# Patient Record
Sex: MALE | Race: WHITE | NOT HISPANIC OR LATINO | URBAN - METROPOLITAN AREA
[De-identification: names, ages, dates, MRNs, and addresses within clinical notes are randomized per-mention and may not be internally consistent; named-entity substitution may affect disease eponyms.]

---

## 2018-01-22 ENCOUNTER — EMERGENCY (EMERGENCY)
Facility: HOSPITAL | Age: 63
LOS: 1 days | Discharge: HOME | End: 2018-01-22
Attending: EMERGENCY MEDICINE | Admitting: EMERGENCY MEDICINE
Payer: COMMERCIAL

## 2018-01-22 VITALS
SYSTOLIC BLOOD PRESSURE: 170 MMHG | RESPIRATION RATE: 16 BRPM | HEART RATE: 84 BPM | TEMPERATURE: 99 F | DIASTOLIC BLOOD PRESSURE: 92 MMHG | OXYGEN SATURATION: 96 %

## 2018-01-22 PROCEDURE — 99284 EMERGENCY DEPT VISIT MOD MDM: CPT | Mod: 25

## 2018-01-22 RX ORDER — ACETAMINOPHEN 500 MG
1000 TABLET ORAL ONCE
Qty: 0 | Refills: 0 | Status: COMPLETED | OUTPATIENT
Start: 2018-01-22 | End: 2018-01-22

## 2018-01-22 RX ORDER — KETOROLAC TROMETHAMINE 30 MG/ML
15 SYRINGE (ML) INJECTION ONCE
Qty: 0 | Refills: 0 | Status: DISCONTINUED | OUTPATIENT
Start: 2018-01-22 | End: 2018-01-23

## 2018-01-22 RX ORDER — SODIUM CHLORIDE 9 MG/ML
2000 INJECTION INTRAMUSCULAR; INTRAVENOUS; SUBCUTANEOUS ONCE
Qty: 0 | Refills: 0 | Status: COMPLETED | OUTPATIENT
Start: 2018-01-22 | End: 2018-01-22

## 2018-01-22 NOTE — ED PROVIDER NOTE - OBJECTIVE STATEMENT
62 year old male, past medical history HTN, who presents to the ED for cough. 1 month of cough, bringing up green sputum. Had some mild shortness of breath and chest pain with the cough, improving after antibiotics, he is unsure which one. He thinks azithromycin, prescribed by Hima, who is PCP. No fevers but reports chills. No nausea, vomiting. Reports loose stools. Has also been taking tamiflu for 2 days. No sick contacts. No recent travel outside.

## 2018-01-22 NOTE — ED PROVIDER NOTE - MEDICAL DECISION MAKING DETAILS
Report called to Anat MOLINA. Plan of care discussed. Patient reports 6/10 abdominal discomfort. Dozing intermittently without s/s of distress. Patient on bedpan attempting to urinate. Friend diana called multiple times and provided with update per patient's request. Patient spoke with diana on phone. Patient repeatedly removing oxygen tubing, left on room air with oxygen saturation at 95%   62 year old male, past medical history HTN, who presents to the ED for cough. 1 month of cough, bringing up green sputum. Eval for resistant pneumonia given on abx now, viral illness. Obtain labwork. cxr, rvp, symptomatic control.

## 2018-01-22 NOTE — ED PROVIDER NOTE - ATTENDING CONTRIBUTION TO CARE
I was physically present for the key portions of the evaluation and management (E/M) service provided.  I agree with the above history, physical, and plan which I have reviewed and edited where appropriate. See HPI, PE, PMH, ROS and MDM above.   GEN: NAD, awake, eyes open spontaneously  HEENT: NCAT, MMM, Trachea midline, normal conjunctiva, perrl  CHEST/LUNGS: Non-tachypneic, CTAB, bilateral breath sounds, cough with some transmitted upper airway rattling noted  CARDIAC: Non-tachycardic, normal perfusion  ABDOMEN: Soft, NTND, No rebound/guarding  MSK: No edema, no gross deformity of extremities  SKIN: No rashes, no petechiae, no vesicles  NEURO: CN grossly intact, normal coordination, no focal motor or sensory deficits  PSYCH: Alert, appropriate, cooperative, with capacity and insight   Patient with likely viral uri taken antibiotics and on tamiflu   will screen for CAPna with iv, labs, cxr, and rvp for possible flu  Will follow up on labs, analgesia, reassess and disposition as clinically indicated.   Patient  understands anticipatory guidance and was given strict return and follow up precautions. The patient has been informed of all concerning signs and symptoms to return to Emergency Department, the necessity to follow up with PMD/Clinic/follow up provided within 2-3 days was explained, and the patient reports understanding of above with capacity and insight if patient disposition is to be home.

## 2018-01-22 NOTE — ED ADULT NURSE NOTE - OBJECTIVE STATEMENT
Patient presented to ED c/o flu/cold like symptoms for 3 weeks. Cough, chills, malaise, body aches, most of the symptoms are resolving but still coughing.

## 2018-01-22 NOTE — ED PROVIDER NOTE - SHIFT CHANGE DETAILS
Received sign-out from Dr. Bruno regarding this patient awaiting labs and CXR. Patient appeared at the time of sign-out entirely well and very comfortable. His labs are unremarkable and his CXR is negative. His rapid flu has not yet resulted but he did not want to wait any longer. His symptoms have been for an extended duration anyway. Still entirely well-appearing and comfortable, he has been discharged as requested.

## 2018-01-23 VITALS
OXYGEN SATURATION: 97 % | SYSTOLIC BLOOD PRESSURE: 129 MMHG | RESPIRATION RATE: 17 BRPM | HEART RATE: 75 BPM | DIASTOLIC BLOOD PRESSURE: 77 MMHG

## 2018-01-23 LAB
ALBUMIN SERPL ELPH-MCNC: 3.7 G/DL — SIGNIFICANT CHANGE UP (ref 3.3–5)
ALP SERPL-CCNC: 80 U/L — SIGNIFICANT CHANGE UP (ref 40–120)
ALT FLD-CCNC: 20 U/L RC — SIGNIFICANT CHANGE UP (ref 10–45)
ANION GAP SERPL CALC-SCNC: 11 MMOL/L — SIGNIFICANT CHANGE UP (ref 5–17)
AST SERPL-CCNC: 26 U/L — SIGNIFICANT CHANGE UP (ref 10–40)
BASOPHILS # BLD AUTO: 0 K/UL — SIGNIFICANT CHANGE UP (ref 0–0.2)
BASOPHILS NFR BLD AUTO: 0.1 % — SIGNIFICANT CHANGE UP (ref 0–2)
BILIRUB SERPL-MCNC: 0.2 MG/DL — SIGNIFICANT CHANGE UP (ref 0.2–1.2)
BUN SERPL-MCNC: 18 MG/DL — SIGNIFICANT CHANGE UP (ref 7–23)
CALCIUM SERPL-MCNC: 9.1 MG/DL — SIGNIFICANT CHANGE UP (ref 8.4–10.5)
CHLORIDE SERPL-SCNC: 101 MMOL/L — SIGNIFICANT CHANGE UP (ref 96–108)
CO2 SERPL-SCNC: 25 MMOL/L — SIGNIFICANT CHANGE UP (ref 22–31)
CREAT SERPL-MCNC: 1 MG/DL — SIGNIFICANT CHANGE UP (ref 0.5–1.3)
EOSINOPHIL # BLD AUTO: 0.5 K/UL — SIGNIFICANT CHANGE UP (ref 0–0.5)
EOSINOPHIL NFR BLD AUTO: 6.2 % — HIGH (ref 0–6)
GLUCOSE SERPL-MCNC: 109 MG/DL — HIGH (ref 70–99)
HCT VFR BLD CALC: 39.3 % — SIGNIFICANT CHANGE UP (ref 39–50)
HGB BLD-MCNC: 13.7 G/DL — SIGNIFICANT CHANGE UP (ref 13–17)
LYMPHOCYTES # BLD AUTO: 2 K/UL — SIGNIFICANT CHANGE UP (ref 1–3.3)
LYMPHOCYTES # BLD AUTO: 23.5 % — SIGNIFICANT CHANGE UP (ref 13–44)
MCHC RBC-ENTMCNC: 30.1 PG — SIGNIFICANT CHANGE UP (ref 27–34)
MCHC RBC-ENTMCNC: 34.8 GM/DL — SIGNIFICANT CHANGE UP (ref 32–36)
MCV RBC AUTO: 86.4 FL — SIGNIFICANT CHANGE UP (ref 80–100)
MONOCYTES # BLD AUTO: 1.1 K/UL — HIGH (ref 0–0.9)
MONOCYTES NFR BLD AUTO: 12.7 % — SIGNIFICANT CHANGE UP (ref 2–14)
NEUTROPHILS # BLD AUTO: 4.9 K/UL — SIGNIFICANT CHANGE UP (ref 1.8–7.4)
NEUTROPHILS NFR BLD AUTO: 57.5 % — SIGNIFICANT CHANGE UP (ref 43–77)
PLATELET # BLD AUTO: 302 K/UL — SIGNIFICANT CHANGE UP (ref 150–400)
POTASSIUM SERPL-MCNC: 4 MMOL/L — SIGNIFICANT CHANGE UP (ref 3.5–5.3)
POTASSIUM SERPL-SCNC: 4 MMOL/L — SIGNIFICANT CHANGE UP (ref 3.5–5.3)
PROT SERPL-MCNC: 7.1 G/DL — SIGNIFICANT CHANGE UP (ref 6–8.3)
RAPID RVP RESULT: SIGNIFICANT CHANGE UP
RBC # BLD: 4.55 M/UL — SIGNIFICANT CHANGE UP (ref 4.2–5.8)
RBC # FLD: 13 % — SIGNIFICANT CHANGE UP (ref 10.3–14.5)
SODIUM SERPL-SCNC: 137 MMOL/L — SIGNIFICANT CHANGE UP (ref 135–145)
WBC # BLD: 8.6 K/UL — SIGNIFICANT CHANGE UP (ref 3.8–10.5)
WBC # FLD AUTO: 8.6 K/UL — SIGNIFICANT CHANGE UP (ref 3.8–10.5)

## 2018-01-23 PROCEDURE — 71046 X-RAY EXAM CHEST 2 VIEWS: CPT | Mod: 26

## 2018-01-23 PROCEDURE — 71046 X-RAY EXAM CHEST 2 VIEWS: CPT

## 2018-01-23 PROCEDURE — 87633 RESP VIRUS 12-25 TARGETS: CPT

## 2018-01-23 PROCEDURE — 87581 M.PNEUMON DNA AMP PROBE: CPT

## 2018-01-23 PROCEDURE — 87798 DETECT AGENT NOS DNA AMP: CPT

## 2018-01-23 PROCEDURE — 85027 COMPLETE CBC AUTOMATED: CPT

## 2018-01-23 PROCEDURE — 80053 COMPREHEN METABOLIC PANEL: CPT

## 2018-01-23 PROCEDURE — 99283 EMERGENCY DEPT VISIT LOW MDM: CPT | Mod: 25

## 2018-01-23 PROCEDURE — 87486 CHLMYD PNEUM DNA AMP PROBE: CPT

## 2018-01-23 RX ADMIN — SODIUM CHLORIDE 4000 MILLILITER(S): 9 INJECTION INTRAMUSCULAR; INTRAVENOUS; SUBCUTANEOUS at 00:01

## 2018-01-23 RX ADMIN — Medication 1000 MILLIGRAM(S): at 00:09

## 2018-01-23 RX ADMIN — Medication 1000 MILLIGRAM(S): at 00:30

## 2018-01-23 RX ADMIN — Medication 100 MILLIGRAM(S): at 00:09

## 2021-01-26 ENCOUNTER — APPOINTMENT (OUTPATIENT)
Dept: SURGERY | Facility: CLINIC | Age: 66
End: 2021-01-26
Payer: COMMERCIAL

## 2021-01-26 VITALS
TEMPERATURE: 97.6 F | BODY MASS INDEX: 27.9 KG/M2 | HEART RATE: 87 BPM | SYSTOLIC BLOOD PRESSURE: 151 MMHG | HEIGHT: 72 IN | WEIGHT: 206 LBS | OXYGEN SATURATION: 98 % | DIASTOLIC BLOOD PRESSURE: 93 MMHG

## 2021-01-26 PROCEDURE — 99072 ADDL SUPL MATRL&STAF TM PHE: CPT

## 2021-01-26 PROCEDURE — 99204 OFFICE O/P NEW MOD 45 MIN: CPT

## 2021-01-26 NOTE — ASSESSMENT
[FreeTextEntry1] : Right inguinal hernia with enlarged right hemiscrotum/testicle, suspect hydrocele.\par Recommend  evaluation in consideration for simultaneous repair of RIH and Hydrocele.\par Referred to Markus Myles MD\par \par Risk, Benefits, and Alternatives to surgery have been discussed.  This includes but is not limited to bleeding, infection, damage to adjacent structures, need for additional surgery or interventions, adverse effects of anesthesia such as cardio-respiratory complications, prolonged intubation, cardiac arrhythmia, arrest, and or death.  Risks of forgoing surgery have also been discussed including progression of, and/or worsening of current condition which may then require urgent or emergent treatment or surgery.\par \par Educational material courtesy of the American College of Surgeons with respect to inguinal and femoral hernias has been provided for the patient's review and all questions have been answered.\par \par f/u after  evaluation to coordinate date for surgery.\par \par Signs and symptoms of incarceration/strangulation/obstruction have been reviewed with the patient.  Should such symptoms present, urgent medical attention should be sought.  Contact my office immediately and or head to your nearest emergency room for evaluation and treatment.  This may require immediate surgical repair.\par \par

## 2021-01-26 NOTE — HISTORY OF PRESENT ILLNESS
[de-identified] : 65 year old gentleman presents with Right groin bulge and scrotal swelling. Referred by colleague who was a patient of mine (works on [employer])

## 2021-01-26 NOTE — PHYSICAL EXAM
[Normal Breath Sounds] : Normal breath sounds [Normal Heart Sounds] : normal heart sounds [Normal Rate and Rhythm] : normal rate and rhythm [No Rash or Lesion] : No rash or lesion [Alert] : alert [Oriented to Person] : oriented to person [Oriented to Place] : oriented to place [Oriented to Time] : oriented to time [Calm] : calm [de-identified] : Healthy appearing gentleman in no acute distress [de-identified] : TIRSO VERDIN EOMI [de-identified] : Soft, nontender nondistended, positive bowel sounds in all four quads.   No rebound or guarding.  Right sided inguinal hernia with scrotal enlargement/swelling suspicious for hydrocele.  Left hemiscrotum unremarkable.\par  [de-identified] : see abd exam

## 2021-05-18 ENCOUNTER — OUTPATIENT (OUTPATIENT)
Dept: OUTPATIENT SERVICES | Facility: HOSPITAL | Age: 66
LOS: 1 days | End: 2021-05-18
Payer: COMMERCIAL

## 2021-05-18 ENCOUNTER — APPOINTMENT (OUTPATIENT)
Dept: INTERNAL MEDICINE | Facility: CLINIC | Age: 66
End: 2021-05-18
Payer: COMMERCIAL

## 2021-05-18 ENCOUNTER — NON-APPOINTMENT (OUTPATIENT)
Age: 66
End: 2021-05-18

## 2021-05-18 VITALS
TEMPERATURE: 97.2 F | DIASTOLIC BLOOD PRESSURE: 90 MMHG | SYSTOLIC BLOOD PRESSURE: 140 MMHG | OXYGEN SATURATION: 97 % | WEIGHT: 205 LBS | HEART RATE: 78 BPM | RESPIRATION RATE: 14 BRPM | BODY MASS INDEX: 27.8 KG/M2

## 2021-05-18 VITALS
SYSTOLIC BLOOD PRESSURE: 145 MMHG | DIASTOLIC BLOOD PRESSURE: 90 MMHG | OXYGEN SATURATION: 98 % | WEIGHT: 210.1 LBS | TEMPERATURE: 97 F | RESPIRATION RATE: 16 BRPM | HEIGHT: 72 IN | HEART RATE: 76 BPM

## 2021-05-18 VITALS — SYSTOLIC BLOOD PRESSURE: 136 MMHG | DIASTOLIC BLOOD PRESSURE: 80 MMHG

## 2021-05-18 DIAGNOSIS — Z80.7 FAMILY HISTORY OF OTHER MALIGNANT NEOPLASMS OF LYMPHOID, HEMATOPOIETIC AND RELATED TISSUES: ICD-10-CM

## 2021-05-18 DIAGNOSIS — K40.90 UNILATERAL INGUINAL HERNIA, WITHOUT OBSTRUCTION OR GANGRENE, NOT SPECIFIED AS RECURRENT: ICD-10-CM

## 2021-05-18 DIAGNOSIS — Z98.890 OTHER SPECIFIED POSTPROCEDURAL STATES: Chronic | ICD-10-CM

## 2021-05-18 DIAGNOSIS — N43.3 HYDROCELE, UNSPECIFIED: ICD-10-CM

## 2021-05-18 DIAGNOSIS — Z01.818 ENCOUNTER FOR OTHER PREPROCEDURAL EXAMINATION: ICD-10-CM

## 2021-05-18 LAB
ALBUMIN SERPL ELPH-MCNC: 3.9 G/DL — SIGNIFICANT CHANGE UP (ref 3.3–5)
ALP SERPL-CCNC: 71 U/L — SIGNIFICANT CHANGE UP (ref 40–120)
ALT FLD-CCNC: 27 U/L — SIGNIFICANT CHANGE UP (ref 12–78)
ANION GAP SERPL CALC-SCNC: 8 MMOL/L — SIGNIFICANT CHANGE UP (ref 5–17)
AST SERPL-CCNC: 29 U/L — SIGNIFICANT CHANGE UP (ref 15–37)
BILIRUB SERPL-MCNC: 0.4 MG/DL — SIGNIFICANT CHANGE UP (ref 0.2–1.2)
BUN SERPL-MCNC: 20 MG/DL — SIGNIFICANT CHANGE UP (ref 7–23)
CALCIUM SERPL-MCNC: 8.9 MG/DL — SIGNIFICANT CHANGE UP (ref 8.5–10.1)
CHLORIDE SERPL-SCNC: 109 MMOL/L — HIGH (ref 96–108)
CO2 SERPL-SCNC: 26 MMOL/L — SIGNIFICANT CHANGE UP (ref 22–31)
CREAT SERPL-MCNC: 1.4 MG/DL — HIGH (ref 0.5–1.3)
GLUCOSE SERPL-MCNC: 81 MG/DL — SIGNIFICANT CHANGE UP (ref 70–99)
HCT VFR BLD CALC: 42.7 % — SIGNIFICANT CHANGE UP (ref 39–50)
HGB BLD-MCNC: 14.5 G/DL — SIGNIFICANT CHANGE UP (ref 13–17)
MCHC RBC-ENTMCNC: 29.1 PG — SIGNIFICANT CHANGE UP (ref 27–34)
MCHC RBC-ENTMCNC: 34 GM/DL — SIGNIFICANT CHANGE UP (ref 32–36)
MCV RBC AUTO: 85.6 FL — SIGNIFICANT CHANGE UP (ref 80–100)
NRBC # BLD: 0 /100 WBCS — SIGNIFICANT CHANGE UP (ref 0–0)
PLATELET # BLD AUTO: 241 K/UL — SIGNIFICANT CHANGE UP (ref 150–400)
POTASSIUM SERPL-MCNC: 4.1 MMOL/L — SIGNIFICANT CHANGE UP (ref 3.5–5.3)
POTASSIUM SERPL-SCNC: 4.1 MMOL/L — SIGNIFICANT CHANGE UP (ref 3.5–5.3)
PROT SERPL-MCNC: 7.3 G/DL — SIGNIFICANT CHANGE UP (ref 6–8.3)
RBC # BLD: 4.99 M/UL — SIGNIFICANT CHANGE UP (ref 4.2–5.8)
RBC # FLD: 13.6 % — SIGNIFICANT CHANGE UP (ref 10.3–14.5)
SODIUM SERPL-SCNC: 143 MMOL/L — SIGNIFICANT CHANGE UP (ref 135–145)
WBC # BLD: 7.17 K/UL — SIGNIFICANT CHANGE UP (ref 3.8–10.5)
WBC # FLD AUTO: 7.17 K/UL — SIGNIFICANT CHANGE UP (ref 3.8–10.5)

## 2021-05-18 PROCEDURE — 80053 COMPREHEN METABOLIC PANEL: CPT

## 2021-05-18 PROCEDURE — 99203 OFFICE O/P NEW LOW 30 MIN: CPT

## 2021-05-18 PROCEDURE — 93010 ELECTROCARDIOGRAM REPORT: CPT

## 2021-05-18 PROCEDURE — 99072 ADDL SUPL MATRL&STAF TM PHE: CPT

## 2021-05-18 PROCEDURE — 93005 ELECTROCARDIOGRAM TRACING: CPT

## 2021-05-18 PROCEDURE — 36415 COLL VENOUS BLD VENIPUNCTURE: CPT

## 2021-05-18 PROCEDURE — G0463: CPT

## 2021-05-18 PROCEDURE — 85027 COMPLETE CBC AUTOMATED: CPT

## 2021-05-18 RX ORDER — AMLODIPINE BESYLATE 5 MG/1
TABLET ORAL
Refills: 0 | Status: DISCONTINUED | COMMUNITY
End: 2021-05-18

## 2021-05-18 NOTE — H&P PST ADULT - NSICDXPASTMEDICALHX_GEN_ALL_CORE_FT
PAST MEDICAL HISTORY:  Hydrocele     Unilateral inguinal hernia     Unilateral inguinal hernia without obstruction or gangrene

## 2021-05-18 NOTE — H&P PST ADULT - HISTORY OF PRESENT ILLNESS
67 yo male with HTN scheduled for Open Repair Right Inguinal Hernia on 5/24/21.  Patient states he has had the hernia for 6 months to a year denies any pain.  Patient states he has had increased swelling 65 yo male with HTN scheduled for Open Repair Right Inguinal Hernia on 5/24/21 with Dr Whiting.  Patient states he has had the hernia for 6 months to a year denies any pain.  Patient states he has had increased swelling

## 2021-05-18 NOTE — H&P PST ADULT - BMI (KG/M2)
INITIAL OUTPATIENT NUTRITION CONSULTATION    Nutrition Assessment    Medical Diagnosis: Obesity    Physical Findings: fatigue    Client Hx: 34year old    Problem List as of 4/27/2017        Cardiovascular    Essential hypertension       Digestive    G 5-6 am  Breakfast: 8-8:30  Cookies and coffee. Occasionally Egg McMuffin or Croissant, ham and cheese sandwich at [employer]: Skips. Co-workers bring in fast food but she does not eat typically  Dinner: Mother makes [place] food.   Rice, beans, tor felt a better option would be to eat convenience meals at home several times weekly would help with portions. Reducing soda was encouraged. Pt is relying on soda for energy as she is not eating or sleeping well.   Discussed connection between meal skippin 28.5

## 2021-05-18 NOTE — H&P PST ADULT - ASSESSMENT
Spontaneous, unlabored and symmetrical 67 yo male with HTN scheduled for Open Repair Right Inguinal Hernia on 5/24/21 with Dr Whiting.

## 2021-05-18 NOTE — H&P PST ADULT - NSANTHOSAYNRD_GEN_A_CORE
No. TANESHA screening performed.  STOP BANG Legend: 0-2 = LOW Risk; 3-4 = INTERMEDIATE Risk; 5-8 = HIGH Risk

## 2021-05-18 NOTE — H&P PST ADULT - NEUROLOGICAL DETAILS
[Negative] : Constitutional
alert and oriented x 3/responds to verbal commands/cranial nerves intact

## 2021-05-18 NOTE — H&P PST ADULT - PAIN SCALE PREFERRED, PROFILE
"Pt returned from court hearing escorted by deputy @ 0941. Pt states feeling \"down\", feeling angry and hurt, admits to feeling suicidal, but verbally contracts for safety here. Clothing search completed. Pt states was told that no decisions would be made until tomorrow. Flat affect; appears sad and depressed; cooperative with staff.  " numerical 0-10

## 2021-05-18 NOTE — H&P PST ADULT - NSICDXFAMILYHX_GEN_ALL_CORE_FT
FAMILY HISTORY:  Father  Still living? Unknown  FH: lymphoma, Age at diagnosis: Age Unknown    Mother  Still living? No  FH: lymphoma, Age at diagnosis: Age Unknown

## 2021-05-21 NOTE — ASU PATIENT PROFILE, ADULT - PMH
Hydrocele    Unilateral inguinal hernia    Unilateral inguinal hernia without obstruction or gangrene

## 2021-05-21 NOTE — ADDENDUM
[FreeTextEntry1] : Presurgical labs are within acceptable limits.\par Pt can follow up after surgery regarding mildly elevated creatinine.\par There are no medical contraindications to proceeding with the planned surgery.\par Routine perioperative hemodynamic monitoring is recommended

## 2021-05-21 NOTE — HISTORY OF PRESENT ILLNESS
[No Pertinent Cardiac History] : no history of aortic stenosis, atrial fibrillation, coronary artery disease, recent myocardial infarction, or implantable device/pacemaker [No Pertinent Pulmonary History] : no history of asthma, COPD, sleep apnea, or smoking [No Adverse Anesthesia Reaction] : no adverse anesthesia reaction in self or family member [Chronic Anticoagulation] : no chronic anticoagulation [Chronic Kidney Disease] : no chronic kidney disease [Diabetes] : no diabetes [FreeTextEntry1] : Right inguinal hernia repair [FreeTextEntry2] : 5/24/21 [FreeTextEntry3] : Dr. Whiting [FreeTextEntry4] : MARIANNA PASTORA,  51, is here for presurgical evaluation .\par Pt is overall feeling well.\par Pt denies chest pain, dyspnea, palpitations, lightheadedness, fever, chills, or sweats

## 2021-05-21 NOTE — ASSESSMENT
[FreeTextEntry4] : EKG SR, PAC\par Pt is medically appropriate for surgery pending review of presurgical alb results.\par

## 2021-05-23 ENCOUNTER — TRANSCRIPTION ENCOUNTER (OUTPATIENT)
Age: 66
End: 2021-05-23

## 2021-05-24 ENCOUNTER — RESULT REVIEW (OUTPATIENT)
Age: 66
End: 2021-05-24

## 2021-05-24 ENCOUNTER — APPOINTMENT (OUTPATIENT)
Dept: SURGERY | Facility: HOSPITAL | Age: 66
End: 2021-05-24

## 2021-05-24 ENCOUNTER — OUTPATIENT (OUTPATIENT)
Dept: OUTPATIENT SERVICES | Facility: HOSPITAL | Age: 66
LOS: 1 days | End: 2021-05-24
Payer: COMMERCIAL

## 2021-05-24 VITALS
SYSTOLIC BLOOD PRESSURE: 115 MMHG | RESPIRATION RATE: 13 BRPM | DIASTOLIC BLOOD PRESSURE: 81 MMHG | OXYGEN SATURATION: 99 % | HEART RATE: 63 BPM

## 2021-05-24 VITALS
HEIGHT: 72 IN | OXYGEN SATURATION: 98 % | HEART RATE: 69 BPM | DIASTOLIC BLOOD PRESSURE: 92 MMHG | RESPIRATION RATE: 18 BRPM | WEIGHT: 210.1 LBS | SYSTOLIC BLOOD PRESSURE: 170 MMHG | TEMPERATURE: 99 F

## 2021-05-24 DIAGNOSIS — Z98.890 OTHER SPECIFIED POSTPROCEDURAL STATES: Chronic | ICD-10-CM

## 2021-05-24 DIAGNOSIS — K40.90 UNILATERAL INGUINAL HERNIA, WITHOUT OBSTRUCTION OR GANGRENE, NOT SPECIFIED AS RECURRENT: ICD-10-CM

## 2021-05-24 DIAGNOSIS — N43.3 HYDROCELE, UNSPECIFIED: ICD-10-CM

## 2021-05-24 PROCEDURE — 55899 UNLISTED PX MALE GENITAL SYS: CPT

## 2021-05-24 PROCEDURE — 55520 REMOVAL OF SPERM CORD LESION: CPT | Mod: 59

## 2021-05-24 PROCEDURE — 88302 TISSUE EXAM BY PATHOLOGIST: CPT

## 2021-05-24 PROCEDURE — C1781: CPT

## 2021-05-24 PROCEDURE — 49505 PRP I/HERN INIT REDUC >5 YR: CPT

## 2021-05-24 PROCEDURE — 88304 TISSUE EXAM BY PATHOLOGIST: CPT | Mod: 26

## 2021-05-24 PROCEDURE — 88304 TISSUE EXAM BY PATHOLOGIST: CPT

## 2021-05-24 PROCEDURE — 49505 PRP I/HERN INIT REDUC >5 YR: CPT | Mod: RT

## 2021-05-24 PROCEDURE — ZZZZZ: CPT

## 2021-05-24 PROCEDURE — 88302 TISSUE EXAM BY PATHOLOGIST: CPT | Mod: 26

## 2021-05-24 RX ORDER — HYDROMORPHONE HYDROCHLORIDE 2 MG/ML
0.5 INJECTION INTRAMUSCULAR; INTRAVENOUS; SUBCUTANEOUS
Refills: 0 | Status: DISCONTINUED | OUTPATIENT
Start: 2021-05-24 | End: 2021-05-24

## 2021-05-24 RX ORDER — SODIUM CHLORIDE 9 MG/ML
1000 INJECTION, SOLUTION INTRAVENOUS
Refills: 0 | Status: DISCONTINUED | OUTPATIENT
Start: 2021-05-24 | End: 2021-05-24

## 2021-05-24 RX ORDER — ONDANSETRON 8 MG/1
4 TABLET, FILM COATED ORAL ONCE
Refills: 0 | Status: DISCONTINUED | OUTPATIENT
Start: 2021-05-24 | End: 2021-05-24

## 2021-05-24 RX ORDER — OXYCODONE AND ACETAMINOPHEN 5; 325 MG/1; MG/1
1 TABLET ORAL
Qty: 25 | Refills: 0
Start: 2021-05-24

## 2021-05-24 RX ORDER — AZILSARTAN KAMEDOXOMIL 40 MG/1
1 TABLET ORAL
Qty: 0 | Refills: 0 | DISCHARGE

## 2021-05-24 RX ORDER — OXYCODONE HYDROCHLORIDE 5 MG/1
5 TABLET ORAL ONCE
Refills: 0 | Status: DISCONTINUED | OUTPATIENT
Start: 2021-05-24 | End: 2021-05-24

## 2021-05-24 RX ORDER — CEFAZOLIN SODIUM 1 G
2000 VIAL (EA) INJECTION ONCE
Refills: 0 | Status: COMPLETED | OUTPATIENT
Start: 2021-05-24 | End: 2021-05-24

## 2021-05-24 RX ADMIN — SODIUM CHLORIDE 75 MILLILITER(S): 9 INJECTION, SOLUTION INTRAVENOUS at 09:16

## 2021-05-24 RX ADMIN — SODIUM CHLORIDE 75 MILLILITER(S): 9 INJECTION, SOLUTION INTRAVENOUS at 06:38

## 2021-05-24 NOTE — ASU DISCHARGE PLAN (ADULT/PEDIATRIC) - ASU DC SPECIAL INSTRUCTIONSFT
Keep wound at groin clean and dry.  You may remove dressing (clear plastic and gauze bandage) in 48 hours.  Do not remove steri-strips.  Do not bathe/shower until after you follow-up with Dr. Myles and he removes the drain from the right scrotum.       Do not drive for 24 hours after surgery.  Do not drive if taking narcotic pain medications.  Do not drive until pain-free.      No heavy lifting (nothing heavier than 5 lbs.), no strenuous physical activity, no exercise.      You may perform your usual daily tasks as tolerated.      You may resume your usual daily diet as tolerated.     Keep your legs elevated (with your feet 18-20 inches above the heart) as much as tolerated to keep swelling in the groin/scrotal area under control.  Keep supporter with gauze in place until your follow-up with Dr. Myles.      Take percocet as prescribed for severe pain.  You may take over-the-counter ibuprofen 200mg (take 2 for mild, 3 for severe pain) every 6 hours with food.      Follow-up with Dr. Myles in his office on Wednesday for repeat evaluation and drain removal.  Call office for appointment if not already made.  Do not shower while drain is in place.      Follow-up with Dr. Whiting in his office next week - call office for appointment if not already made. Keep wound at groin clean and dry.  You may remove dressing (clear plastic and gauze bandage) in 48 hours.  Do not remove steri-strips.  Keep supporter with gauze in place until your follow-up with Dr. Myles.  Do not bathe/shower until after you follow-up with Dr. Myles and he removes the drain from the right scrotum.       Do not drive for 24 hours after surgery.  Do not drive if taking narcotic pain medications.  Do not drive until pain-free.      No heavy lifting (nothing heavier than 5 lbs.), no strenuous physical activity, no exercise.      Keep your legs elevated (with your feet 18-20 inches above the heart) as much as tolerated to keep swelling in the groin/scrotal area under control.     You may perform your usual daily tasks as tolerated.      You may resume your usual daily diet as tolerated.      Take percocet as prescribed for severe pain.  You may take over-the-counter ibuprofen 200mg (take 2 for mild, 3 for severe pain) every 6 hours with food.      Follow-up with Dr. Myles in his office on Wednesday for repeat evaluation and drain removal.  Call office for appointment if not already made.  Do not shower while drain is in place.      Follow-up with Dr. Whiting in his office next week - call office for appointment if not already made.

## 2021-05-24 NOTE — BRIEF OPERATIVE NOTE - NSICDXBRIEFPROCEDURE_GEN_ALL_CORE_FT
PROCEDURES:  Right hydrocelectomy 24-May-2021 08:22:55  Cody Myles  
PROCEDURES:  Open repair of inguinal hernia using mesh in adult 24-May-2021 09:18:22 Right inguinal hernia repair with mesh, reducible Cali Butler

## 2021-05-24 NOTE — BRIEF OPERATIVE NOTE - OPERATION/FINDINGS
Large right hydrocele
Reducible right inguinal hernia with considerable fibrosis of spermatic cord and undersurface of external oblique fascia.  Concomitant right hydrocele noted, repaired by Urology.

## 2021-05-24 NOTE — ASU DISCHARGE PLAN (ADULT/PEDIATRIC) - CARE PROVIDER_API CALL
Sabino Whiting)  Surgery  [address] [place] [address]  Phone: [phone]  Fax: [phone]  Established Patient  Follow Up Time:     Cody Myles)  Urology  [address] [facility] [address] [address], [address]  [place] [address]  Phone: [phone]  Fax: [phone]  Established Patient  Follow Up Time:

## 2021-05-24 NOTE — BRIEF OPERATIVE NOTE - NSICDXBRIEFPOSTOP_GEN_ALL_CORE_FT
POST-OP DIAGNOSIS:  Right hydrocele 24-May-2021 08:23:16  Cody Myles  
POST-OP DIAGNOSIS:  Unilateral inguinal hernia, without obstruction or gangrene, not specified as recurrent 24-May-2021 09:19:17  Cali Butler

## 2021-05-24 NOTE — BRIEF OPERATIVE NOTE - NSICDXBRIEFPREOP_GEN_ALL_CORE_FT
PRE-OP DIAGNOSIS:  Unilateral inguinal hernia, without obstruction or gangrene, not specified as recurrent 24-May-2021 09:19:07  Cali Butler  
PRE-OP DIAGNOSIS:  Right hydrocele 24-May-2021 08:23:05  Cody Myles

## 2021-05-24 NOTE — ASU PREOP CHECKLIST - NS PREOP CHK HIBICLENS NA
1:

## 2021-05-24 NOTE — ASU DISCHARGE PLAN (ADULT/PEDIATRIC) - PROVIDER TOKENS
PROVIDER:[TOKEN:[[id number]],ESTABLISHEDPATIENT:[T]],PROVIDER:[[id number]],ESTABLISHEDPATIENT:[T]]

## 2021-06-08 ENCOUNTER — APPOINTMENT (OUTPATIENT)
Dept: SURGERY | Facility: CLINIC | Age: 66
End: 2021-06-08
Payer: COMMERCIAL

## 2021-06-08 VITALS
DIASTOLIC BLOOD PRESSURE: 99 MMHG | BODY MASS INDEX: 27.77 KG/M2 | RESPIRATION RATE: 14 BRPM | HEIGHT: 72 IN | OXYGEN SATURATION: 99 % | HEART RATE: 62 BPM | SYSTOLIC BLOOD PRESSURE: 164 MMHG | WEIGHT: 205 LBS | TEMPERATURE: 97 F

## 2021-06-08 PROCEDURE — 99024 POSTOP FOLLOW-UP VISIT: CPT

## 2021-06-08 NOTE — PHYSICAL EXAM
[Normal Breath Sounds] : Normal breath sounds [Normal Heart Sounds] : normal heart sounds [Normal Rate and Rhythm] : normal rate and rhythm [No Rash or Lesion] : No rash or lesion [Alert] : alert [Oriented to Person] : oriented to person [Oriented to Place] : oriented to place [Oriented to Time] : oriented to time [Calm] : calm [de-identified] : Healthy appearing gentleman in no acute distress [de-identified] : TIRSO VERDIN EOMI [de-identified] : Soft, nontender nondistended, positive bowel sounds in all four quads.   No rebound or guarding.  Surgical incision well approximated and healing nicely.  Minimal residual ecchymosis.  Induration.   [de-identified] : Right scrotum swollen with slight elevation of right testicle.  non tender

## 2021-06-08 NOTE — ASSESSMENT
[FreeTextEntry1] : s/p Right inguinal hernia repair and Hydrocelectomy.\par Healing well.\par Warm compress, gentle massage to resolve edema and induration.\par No signs of infection.\par \par f/u with me in one month.\par If pain or discomfort in scrotum or testicle develops, f/u with Dr Myles.

## 2021-06-28 ENCOUNTER — APPOINTMENT (OUTPATIENT)
Dept: INTERNAL MEDICINE | Facility: CLINIC | Age: 66
End: 2021-06-28
Payer: COMMERCIAL

## 2021-06-28 ENCOUNTER — NON-APPOINTMENT (OUTPATIENT)
Age: 66
End: 2021-06-28

## 2021-06-28 VITALS
RESPIRATION RATE: 14 BRPM | DIASTOLIC BLOOD PRESSURE: 80 MMHG | SYSTOLIC BLOOD PRESSURE: 144 MMHG | OXYGEN SATURATION: 97 % | WEIGHT: 205 LBS | TEMPERATURE: 96.1 F | HEART RATE: 79 BPM | BODY MASS INDEX: 27.77 KG/M2 | HEIGHT: 72 IN

## 2021-06-28 DIAGNOSIS — Z01.818 ENCOUNTER FOR OTHER PREPROCEDURAL EXAMINATION: ICD-10-CM

## 2021-06-28 DIAGNOSIS — S49.91XA UNSPECIFIED INJURY OF RIGHT SHOULDER AND UPPER ARM, INITIAL ENCOUNTER: ICD-10-CM

## 2021-06-28 PROCEDURE — 93000 ELECTROCARDIOGRAM COMPLETE: CPT

## 2021-06-28 PROCEDURE — 99072 ADDL SUPL MATRL&STAF TM PHE: CPT

## 2021-06-28 PROCEDURE — 99214 OFFICE O/P EST MOD 30 MIN: CPT | Mod: 25

## 2021-07-02 LAB
ALBUMIN SERPL ELPH-MCNC: 4.6 G/DL
ALP BLD-CCNC: 84 U/L
ALT SERPL-CCNC: 20 U/L
ANION GAP SERPL CALC-SCNC: 14 MMOL/L
APPEARANCE: CLEAR
APTT BLD: 36.1 SEC
AST SERPL-CCNC: 28 U/L
BASOPHILS # BLD AUTO: 0.07 K/UL
BASOPHILS NFR BLD AUTO: 0.9 %
BILIRUB SERPL-MCNC: 0.4 MG/DL
BILIRUBIN URINE: NEGATIVE
BLOOD URINE: NEGATIVE
BUN SERPL-MCNC: 18 MG/DL
CALCIUM SERPL-MCNC: 9.9 MG/DL
CHLORIDE SERPL-SCNC: 105 MMOL/L
CO2 SERPL-SCNC: 20 MMOL/L
COLOR: YELLOW
CREAT SERPL-MCNC: 1.43 MG/DL
EOSINOPHIL # BLD AUTO: 0.44 K/UL
EOSINOPHIL NFR BLD AUTO: 5.9 %
GLUCOSE QUALITATIVE U: NEGATIVE
GLUCOSE SERPL-MCNC: 87 MG/DL
HCT VFR BLD CALC: 42.7 %
HGB BLD-MCNC: 14.3 G/DL
IMM GRANULOCYTES NFR BLD AUTO: 0.1 %
INR PPP: 1.04 RATIO
KETONES URINE: NEGATIVE
LEUKOCYTE ESTERASE URINE: NEGATIVE
LYMPHOCYTES # BLD AUTO: 1.92 K/UL
LYMPHOCYTES NFR BLD AUTO: 25.8 %
MAN DIFF?: NORMAL
MCHC RBC-ENTMCNC: 28.6 PG
MCHC RBC-ENTMCNC: 33.5 GM/DL
MCV RBC AUTO: 85.4 FL
MONOCYTES # BLD AUTO: 0.77 K/UL
MONOCYTES NFR BLD AUTO: 10.3 %
NEUTROPHILS # BLD AUTO: 4.23 K/UL
NEUTROPHILS NFR BLD AUTO: 57 %
NITRITE URINE: NEGATIVE
PH URINE: 6
PLATELET # BLD AUTO: 233 K/UL
POTASSIUM SERPL-SCNC: 4.2 MMOL/L
PROT SERPL-MCNC: 6.9 G/DL
PROTEIN URINE: NORMAL
PT BLD: 12.3 SEC
RBC # BLD: 5 M/UL
RBC # FLD: 13.2 %
SODIUM SERPL-SCNC: 139 MMOL/L
SPECIFIC GRAVITY URINE: 1.03
UROBILINOGEN URINE: NORMAL
WBC # FLD AUTO: 7.44 K/UL

## 2021-07-02 NOTE — ASSESSMENT
[FreeTextEntry4] : EKG NSR\par Pt is medically appropriate for surgery pending review of presurgical lab results

## 2021-07-02 NOTE — ADDENDUM
[FreeTextEntry1] : Presurgical labs are within acceptable limits\par There are no medical contraindications to proceeding with the planned surgery\par Routine perioperative hemodynamic monitoring is recommended

## 2021-07-02 NOTE — HISTORY OF PRESENT ILLNESS
[No Pertinent Cardiac History] : no history of aortic stenosis, atrial fibrillation, coronary artery disease, recent myocardial infarction, or implantable device/pacemaker [No Pertinent Pulmonary History] : no history of asthma, COPD, sleep apnea, or smoking [No Adverse Anesthesia Reaction] : no adverse anesthesia reaction in self or family member [(Patient denies any chest pain, claudication, dyspnea on exertion, orthopnea, palpitations or syncope)] : Patient denies any chest pain, claudication, dyspnea on exertion, orthopnea, palpitations or syncope [Chronic Anticoagulation] : no chronic anticoagulation [Chronic Kidney Disease] : no chronic kidney disease [Diabetes] : no diabetes [FreeTextEntry1] : Right shoulder surgery [FreeTextEntry2] : 7/19/21 [FreeTextEntry3] : Dr. Calloway [FreeTextEntry4] : MARIANNA PASTORA,  51, is here for presurgical evaluation prior to Right shoulder surgery.\par Pt is overall feeling well.\par Pt denies chest pain, dyspnea, palpitations, lightheadedness, fever, chills, or sweats.

## 2021-07-13 ENCOUNTER — APPOINTMENT (OUTPATIENT)
Dept: SURGERY | Facility: CLINIC | Age: 66
End: 2021-07-13
Payer: COMMERCIAL

## 2021-07-13 VITALS
DIASTOLIC BLOOD PRESSURE: 93 MMHG | OXYGEN SATURATION: 100 % | SYSTOLIC BLOOD PRESSURE: 154 MMHG | WEIGHT: 205 LBS | RESPIRATION RATE: 14 BRPM | HEART RATE: 60 BPM | BODY MASS INDEX: 27.77 KG/M2 | TEMPERATURE: 97.8 F | HEIGHT: 72 IN

## 2021-07-13 DIAGNOSIS — Z87.19 PERSONAL HISTORY OF OTHER DISEASES OF THE DIGESTIVE SYSTEM: ICD-10-CM

## 2021-07-13 DIAGNOSIS — N43.3 HYDROCELE, UNSPECIFIED: ICD-10-CM

## 2021-07-13 PROCEDURE — 99024 POSTOP FOLLOW-UP VISIT: CPT

## 2021-07-13 NOTE — ASSESSMENT
[FreeTextEntry1] : s/p Right inguinal hernia repair and Hydrocelectomy.\par Healing well.\par Induration completely resolved.  Incision looks great.\par \par Resume activity as tolerated without restriction.\par \par

## 2021-07-13 NOTE — PHYSICAL EXAM
[Normal Breath Sounds] : Normal breath sounds [Normal Heart Sounds] : normal heart sounds [Normal Rate and Rhythm] : normal rate and rhythm [No Rash or Lesion] : No rash or lesion [Alert] : alert [Oriented to Person] : oriented to person [Oriented to Place] : oriented to place [Oriented to Time] : oriented to time [Calm] : calm [de-identified] : Healthy appearing gentleman in no acute distress [de-identified] : TIRSO VERDIN EOMI [de-identified] : Soft, nontender nondistended, positive bowel sounds in all four quads.   No rebound or guarding.  Surgical incision well approximated and healing nicely.   [de-identified] : Right scrotum swollen with slight elevation of right testicle.  non tender

## 2021-10-20 ENCOUNTER — RX RENEWAL (OUTPATIENT)
Age: 66
End: 2021-10-20

## 2022-03-18 ENCOUNTER — APPOINTMENT (OUTPATIENT)
Dept: ORTHOPEDIC SURGERY | Facility: CLINIC | Age: 67
End: 2022-03-18
Payer: COMMERCIAL

## 2022-03-18 VITALS
HEIGHT: 72 IN | SYSTOLIC BLOOD PRESSURE: 179 MMHG | HEART RATE: 71 BPM | DIASTOLIC BLOOD PRESSURE: 104 MMHG | WEIGHT: 214 LBS | BODY MASS INDEX: 28.99 KG/M2

## 2022-03-18 DIAGNOSIS — M51.26 OTHER INTERVERTEBRAL DISC DISPLACEMENT, LUMBAR REGION: ICD-10-CM

## 2022-03-18 DIAGNOSIS — M51.36 OTHER INTERVERTEBRAL DISC DEGENERATION, LUMBAR REGION: ICD-10-CM

## 2022-03-18 DIAGNOSIS — M54.50 LOW BACK PAIN, UNSPECIFIED: ICD-10-CM

## 2022-03-18 PROCEDURE — 99204 OFFICE O/P NEW MOD 45 MIN: CPT

## 2022-03-18 RX ORDER — DICLOFENAC SODIUM 75 MG/1
75 TABLET, DELAYED RELEASE ORAL
Qty: 30 | Refills: 1 | Status: ACTIVE | COMMUNITY
Start: 2022-03-18 | End: 1900-01-01

## 2022-03-30 ENCOUNTER — RX RENEWAL (OUTPATIENT)
Age: 67
End: 2022-03-30

## 2022-05-05 ENCOUNTER — NON-APPOINTMENT (OUTPATIENT)
Age: 67
End: 2022-05-05

## 2022-05-05 ENCOUNTER — APPOINTMENT (OUTPATIENT)
Dept: CARDIOLOGY | Facility: CLINIC | Age: 67
End: 2022-05-05
Payer: COMMERCIAL

## 2022-05-05 VITALS
TEMPERATURE: 97.6 F | SYSTOLIC BLOOD PRESSURE: 128 MMHG | DIASTOLIC BLOOD PRESSURE: 90 MMHG | OXYGEN SATURATION: 99 % | HEART RATE: 70 BPM

## 2022-05-05 DIAGNOSIS — M48.07 SPINAL STENOSIS, LUMBOSACRAL REGION: ICD-10-CM

## 2022-05-05 PROCEDURE — 99214 OFFICE O/P EST MOD 30 MIN: CPT

## 2022-05-05 RX ORDER — METOPROLOL SUCCINATE 25 MG/1
25 TABLET, EXTENDED RELEASE ORAL
Qty: 30 | Refills: 1 | Status: ACTIVE | COMMUNITY
Start: 2022-05-05 | End: 1900-01-01

## 2022-05-06 LAB
ALDOSTERONE SERUM: 6.3 NG/DL
ANION GAP SERPL CALC-SCNC: 16 MMOL/L
APPEARANCE: CLEAR
BACTERIA: NEGATIVE
BILIRUBIN URINE: NEGATIVE
BLOOD URINE: NEGATIVE
BUN SERPL-MCNC: 18 MG/DL
CALCIUM SERPL-MCNC: 10.1 MG/DL
CALCIUM SERPL-MCNC: 10.1 MG/DL
CHLORIDE SERPL-SCNC: 101 MMOL/L
CO2 SERPL-SCNC: 22 MMOL/L
COLOR: YELLOW
CORTIS SERPL-MCNC: 4.8 UG/DL
CREAT SERPL-MCNC: 1.12 MG/DL
CREAT SPEC-SCNC: 290 MG/DL
EGFR: 71 ML/MIN/1.73M2
GLUCOSE QUALITATIVE U: NEGATIVE
GLUCOSE SERPL-MCNC: 84 MG/DL
HYALINE CASTS: 1 /LPF
KETONES URINE: NEGATIVE
LEUKOCYTE ESTERASE URINE: NEGATIVE
MICROALBUMIN 24H UR DL<=1MG/L-MCNC: 2.8 MG/DL
MICROALBUMIN/CREAT 24H UR-RTO: 10 MG/G
MICROSCOPIC-UA: NORMAL
NITRITE URINE: NEGATIVE
PARATHYROID HORMONE INTACT: 23 PG/ML
PH URINE: 5.5
POTASSIUM SERPL-SCNC: 4.4 MMOL/L
PROTEIN URINE: ABNORMAL
RED BLOOD CELLS URINE: 4 /HPF
RENIN PLASMA: 24.3 PG/ML
SODIUM SERPL-SCNC: 140 MMOL/L
SPECIFIC GRAVITY URINE: 1.04
SQUAMOUS EPITHELIAL CELLS: 0 /HPF
UROBILINOGEN URINE: NORMAL
WHITE BLOOD CELLS URINE: 1 /HPF

## 2022-05-06 RX ORDER — OXYCODONE AND ACETAMINOPHEN 5; 325 MG/1; MG/1
5-325 TABLET ORAL
Qty: 45 | Refills: 0 | Status: ACTIVE | COMMUNITY
Start: 2022-05-05

## 2022-05-09 LAB — CA-I SERPL-SCNC: 5.1 MG/DL

## 2022-05-11 NOTE — HISTORY OF PRESENT ILLNESS
[FreeTextEntry1] : Pt presents as new pt to establish care. \par \par Pt presented for a cortisone injection today for severe lower back pain. Pt was told that she could not receive the injection because his BP was so high, pt does not remember exactly what his BP was at the visit. Pt states he took an extra Valsartan 80 mg and BP has now come down to 128/90.  Pt presents in the office still in severe lower back pain. Denies any headache, blurry vision, dizziness.

## 2022-05-16 ENCOUNTER — APPOINTMENT (OUTPATIENT)
Dept: ORTHOPEDIC SURGERY | Facility: CLINIC | Age: 67
End: 2022-05-16

## 2022-05-18 LAB
METANEPHRINE, PL: 34.5 PG/ML
NORMETANEPHRINE, PL: 135.3 PG/ML

## 2022-05-19 ENCOUNTER — APPOINTMENT (OUTPATIENT)
Dept: INTERNAL MEDICINE | Facility: CLINIC | Age: 67
End: 2022-05-19

## 2022-06-02 ENCOUNTER — APPOINTMENT (OUTPATIENT)
Dept: CARDIOLOGY | Facility: CLINIC | Age: 67
End: 2022-06-02

## 2022-06-02 DIAGNOSIS — Z00.00 ENCOUNTER FOR GENERAL ADULT MEDICAL EXAMINATION W/OUT ABNORMAL FINDINGS: ICD-10-CM

## 2023-06-12 ENCOUNTER — LABORATORY RESULT (OUTPATIENT)
Age: 68
End: 2023-06-12

## 2023-06-12 ENCOUNTER — APPOINTMENT (OUTPATIENT)
Dept: CARDIOLOGY | Facility: CLINIC | Age: 68
End: 2023-06-12
Payer: COMMERCIAL

## 2023-06-12 VITALS
BODY MASS INDEX: 27.77 KG/M2 | DIASTOLIC BLOOD PRESSURE: 80 MMHG | SYSTOLIC BLOOD PRESSURE: 145 MMHG | HEART RATE: 69 BPM | WEIGHT: 205 LBS | HEIGHT: 72 IN | OXYGEN SATURATION: 98 % | TEMPERATURE: 98 F

## 2023-06-12 DIAGNOSIS — I10 ESSENTIAL (PRIMARY) HYPERTENSION: ICD-10-CM

## 2023-06-12 DIAGNOSIS — Z00.00 ENCOUNTER FOR GENERAL ADULT MEDICAL EXAMINATION W/OUT ABNORMAL FINDINGS: ICD-10-CM

## 2023-06-12 PROCEDURE — 93000 ELECTROCARDIOGRAM COMPLETE: CPT

## 2023-06-12 PROCEDURE — 99397 PER PM REEVAL EST PAT 65+ YR: CPT

## 2023-06-12 RX ORDER — VALSARTAN 80 MG/1
80 TABLET, COATED ORAL DAILY
Qty: 90 | Refills: 0 | Status: DISCONTINUED | COMMUNITY
Start: 2022-03-30 | End: 2023-06-12

## 2023-06-12 NOTE — PHYSICAL EXAM
[Well Developed] : well developed [Well Nourished] : well nourished [No Acute Distress] : no acute distress [Normal Conjunctiva] : normal conjunctiva [Normal Venous Pressure] : normal venous pressure [No Carotid Bruit] : no carotid bruit [Clear Lung Fields] : clear lung fields [Good Air Entry] : good air entry [No Respiratory Distress] : no respiratory distress  [Soft] : abdomen soft [Non Tender] : non-tender [No Masses/organomegaly] : no masses/organomegaly [Normal Bowel Sounds] : normal bowel sounds [Normal Gait] : normal gait [No Edema] : no edema [No Cyanosis] : no cyanosis [No Clubbing] : no clubbing [No Varicosities] : no varicosities [No Rash] : no rash [No Skin Lesions] : no skin lesions [Moves all extremities] : moves all extremities [No Focal Deficits] : no focal deficits [Normal Speech] : normal speech [Alert and Oriented] : alert and oriented [Normal memory] : normal memory [de-identified] : Regular rate and rhythm, NL S1, S2, non-displaced PMI, chest non-tender; no rubs,heaves  or gallops a  Grade 2/6 systolic murmur noted at the LSB

## 2023-06-12 NOTE — HISTORY OF PRESENT ILLNESS
[FreeTextEntry1] : This is  _68__yo  male/female who  presents today for general medical exam and to follow up for any medical issues. They deny any new health problems or new family medical history. The patient denies heat/cold intolerance, weight gain or loss, changes in their hair pattern, alteration in sleep habits, or change in their mood patterns. They also deny joint pain, rash, change in vision, or alteration in their bowel patterns.\par \par Pt also states that he has Hx Meningioma and routinely receives MRI Head for surveillance. He previously was followed by Neurologist in [place] but this is no longer an option for him. He is requesting a referral today for MRI Head and new Neurologist.\par \par \par

## 2023-06-19 DIAGNOSIS — E78.41 ELEVATED LIPOPROTEIN(A): ICD-10-CM

## 2023-06-19 DIAGNOSIS — E78.5 HYPERLIPIDEMIA, UNSPECIFIED: ICD-10-CM

## 2023-06-19 DIAGNOSIS — R79.82 ELEVATED C-REACTIVE PROTEIN (CRP): ICD-10-CM

## 2023-06-19 LAB
ALBUMIN SERPL ELPH-MCNC: 4.6 G/DL
ALP BLD-CCNC: 79 U/L
ALT SERPL-CCNC: 24 U/L
ANION GAP SERPL CALC-SCNC: 12 MMOL/L
APO LP(A) SERPL-MCNC: 253.5 NMOL/L
APPEARANCE: CLEAR
AST SERPL-CCNC: 30 U/L
BACTERIA: NEGATIVE /HPF
BILIRUB DIRECT SERPL-MCNC: 0.1 MG/DL
BILIRUB INDIRECT SERPL-MCNC: 0.4 MG/DL
BILIRUB SERPL-MCNC: 0.5 MG/DL
BILIRUBIN URINE: NEGATIVE
BLOOD URINE: NEGATIVE
BUN SERPL-MCNC: 17 MG/DL
CALCIUM SERPL-MCNC: 9.8 MG/DL
CAST: 1 /LPF
CHLORIDE SERPL-SCNC: 103 MMOL/L
CHOLEST SERPL-MCNC: 231 MG/DL
CK SERPL-CCNC: 148 U/L
CO2 SERPL-SCNC: 23 MMOL/L
COLOR: YELLOW
CREAT SERPL-MCNC: 1.12 MG/DL
CRP SERPL HS-MCNC: 3.92 MG/L
EGFR: 71 ML/MIN/1.73M2
EPITHELIAL CELLS: 0 /HPF
ESTIMATED AVERAGE GLUCOSE: 126 MG/DL
GLUCOSE QUALITATIVE U: NEGATIVE MG/DL
GLUCOSE SERPL-MCNC: 82 MG/DL
HBA1C MFR BLD HPLC: 6 %
HDLC SERPL-MCNC: 52 MG/DL
KETONES URINE: NEGATIVE MG/DL
LDLC SERPL CALC-MCNC: 147 MG/DL
LDLC SERPL DIRECT ASSAY-MCNC: 155 MG/DL
LEUKOCYTE ESTERASE URINE: NEGATIVE
MAGNESIUM SERPL-MCNC: 2 MG/DL
MICROSCOPIC-UA: NORMAL
NITRITE URINE: NEGATIVE
NONHDLC SERPL-MCNC: 179 MG/DL
OSMOLALITY SERPL: 290 MOSMOL/KG
PH URINE: 5.5
PHOSPHATE SERPL-MCNC: 3.5 MG/DL
POTASSIUM SERPL-SCNC: 4.5 MMOL/L
PROT SERPL-MCNC: 7.3 G/DL
PROTEIN URINE: NEGATIVE MG/DL
PSA SERPL-MCNC: 2.83 NG/ML
RED BLOOD CELLS URINE: 1 /HPF
SODIUM SERPL-SCNC: 138 MMOL/L
SPECIFIC GRAVITY URINE: 1.02
T3RU NFR SERPL: 1.2 TBI
T4 FREE SERPL-MCNC: 0.9 NG/DL
T4 SERPL-MCNC: 5.9 UG/DL
TRIGL SERPL-MCNC: 158 MG/DL
TSH SERPL-ACNC: 1.15 UIU/ML
URATE SERPL-MCNC: 3.2 MG/DL
UROBILINOGEN URINE: 0.2 MG/DL
WHITE BLOOD CELLS URINE: 0 /HPF

## 2023-06-26 ENCOUNTER — APPOINTMENT (OUTPATIENT)
Dept: CARDIOLOGY | Facility: CLINIC | Age: 68
End: 2023-06-26
Payer: COMMERCIAL

## 2023-06-26 PROCEDURE — 93880 EXTRACRANIAL BILAT STUDY: CPT

## 2023-06-26 PROCEDURE — 93306 TTE W/DOPPLER COMPLETE: CPT

## 2023-06-27 RX ORDER — AZILSARTAN KAMEDOXOMIL 40 MG/1
40 TABLET ORAL
Qty: 90 | Refills: 1 | Status: DISCONTINUED | COMMUNITY
Start: 2021-10-20 | End: 2023-06-27

## 2023-07-05 ENCOUNTER — APPOINTMENT (OUTPATIENT)
Dept: MRI IMAGING | Facility: CLINIC | Age: 68
End: 2023-07-05
Payer: COMMERCIAL

## 2023-07-05 ENCOUNTER — OUTPATIENT (OUTPATIENT)
Dept: OUTPATIENT SERVICES | Facility: HOSPITAL | Age: 68
LOS: 1 days | End: 2023-07-05
Payer: COMMERCIAL

## 2023-07-05 DIAGNOSIS — Z98.890 OTHER SPECIFIED POSTPROCEDURAL STATES: Chronic | ICD-10-CM

## 2023-07-05 DIAGNOSIS — Z00.8 ENCOUNTER FOR OTHER GENERAL EXAMINATION: ICD-10-CM

## 2023-07-05 PROCEDURE — 70553 MRI BRAIN STEM W/O & W/DYE: CPT

## 2023-07-05 PROCEDURE — A9585: CPT

## 2023-07-05 PROCEDURE — 70553 MRI BRAIN STEM W/O & W/DYE: CPT | Mod: 26

## 2023-07-28 ENCOUNTER — APPOINTMENT (OUTPATIENT)
Dept: NEUROSURGERY | Facility: CLINIC | Age: 68
End: 2023-07-28
Payer: COMMERCIAL

## 2023-07-28 VITALS
DIASTOLIC BLOOD PRESSURE: 69 MMHG | WEIGHT: 205 LBS | HEIGHT: 72 IN | BODY MASS INDEX: 27.77 KG/M2 | HEART RATE: 67 BPM | OXYGEN SATURATION: 96 % | SYSTOLIC BLOOD PRESSURE: 113 MMHG

## 2023-07-28 DIAGNOSIS — D32.0 BENIGN NEOPLASM OF CEREBRAL MENINGES: ICD-10-CM

## 2023-07-28 DIAGNOSIS — D32.9 BENIGN NEOPLASM OF MENINGES, UNSPECIFIED: ICD-10-CM

## 2023-07-28 PROCEDURE — 99205 OFFICE O/P NEW HI 60 MIN: CPT

## 2023-07-31 PROBLEM — D32.9 MENINGIOMA: Status: ACTIVE | Noted: 2023-06-12

## 2023-08-01 PROBLEM — D32.0 CEREBRAL MENINGIOMA: Status: ACTIVE | Noted: 2023-08-01

## 2023-08-01 NOTE — HISTORY OF PRESENT ILLNESS
[de-identified] : Yunior Sharif is a pleasant right handed 68-year-old male who presents for consultation regarding a newly diagnosed meningioma.  Pt reports that he was having blurry vision that has resolved however MRI brain was ordered by Dr. Mejia - ophthalmologist.  He denies all neurological symptoms.

## 2023-08-01 NOTE — REASON FOR VISIT
[New Patient Visit] : a new patient visit [Referred By: _________] : Patient was referred by CASPER [Spouse] : spouse

## 2023-08-01 NOTE — PHYSICAL EXAM
[General Appearance - Alert] : alert [General Appearance - Well Nourished] : well nourished [General Appearance - Well-Appearing] : healthy appearing [Oriented To Time, Place, And Person] : oriented to person, place, and time [Affect] : the affect was normal [Memory Recent] : recent memory was not impaired [Person] : oriented to person [Place] : oriented to place [Time] : oriented to time [Cranial Nerves Optic (II)] : visual acuity intact bilaterally,  pupils equal round and reactive to light [Cranial Nerves Oculomotor (III)] : extraocular motion intact [Cranial Nerves Trigeminal (V)] : facial sensation intact symmetrically [Cranial Nerves Facial (VII)] : face symmetrical [Cranial Nerves Vestibulocochlear (VIII)] : hearing was intact bilaterally [Cranial Nerves Glossopharyngeal (IX)] : tongue and palate midline [Cranial Nerves Accessory (XI - Cranial And Spinal)] : head turning and shoulder shrug symmetric [Cranial Nerves Hypoglossal (XII)] : there was no tongue deviation with protrusion [Motor Tone] : muscle tone was normal in all four extremities [Motor Strength] : muscle strength was normal in all four extremities [Motor Handedness Right-Handed] : the patient is right hand dominant [Sensation Tactile Decrease] : light touch was intact [Sclera] : the sclera and conjunctiva were normal [Outer Ear] : the ears and nose were normal in appearance [Neck Appearance] : the appearance of the neck was normal [] : no respiratory distress [Respiration, Rhythm And Depth] : normal respiratory rhythm and effort [Exaggerated Use Of Accessory Muscles For Inspiration] : no accessory muscle use [Heart Rate And Rhythm] : heart rate was normal and rhythm regular [Abnormal Walk] : normal gait [Involuntary Movements] : no involuntary movements were seen [Skin Color & Pigmentation] : normal skin color and pigmentation

## 2023-10-10 NOTE — H&P PST ADULT - NSICDXPROBLEM_GEN_ALL_CORE_FT
Addended by: JULIETA GAY on: 10/9/2023 08:08 PM     Modules accepted: Orders     PROBLEM DIAGNOSES  Problem: Unilateral inguinal hernia without obstruction or gangrene  Assessment and Plan: check labs cbc cmp '  ekg  medical clearance patient has had 2 Covid vaccines not scheduled for covid testin  hibiclens x 3 days with written and verbal instructions given  patient verbalized uderstanding of instructions

## 2023-11-12 NOTE — H&P PST ADULT - HEART RATE (BEATS/MIN)
74 PHYSICAL EXAM:  -- General: Sleeping comfortably  -- Respiratory: Nasal BiPAP mask in place. Breathing comfortably on current support. Diminished aeration on the left, full aeration on the right with no wheezing.  -- Cardiovascular: Tachycardic with regular rhythm, no murmur, 2+ distal pulses  -- Abdomen: Soft, non-tender, non-distended  -- Extremities: Warm and well-perfused  -- Neurologic: Stirs on exam, moves all extremities spontaneously    ASSESSMENT/PLAN BY SYSTEMS:   Shahana is a 2-year-old female with history of albuterol-responsive wheezing and food allergies admitted with acute hypoxemic respiratory failure due to status asthmaticus, now stabilized on NIPPV.    NEUROLOGIC:   -- Tylenol PRN fever and/or pain    RESPIRATORY:  -- Continue beta-agonists and IV steroids  -- Titrate respiratory support as needed based on respiratory effort and gas exchange  -- [employer] consult, appreciate recommendations    CARDIOVASCULAR:  -- Sinus tachycardia in the setting of albuterol, otherwise hemodynamically stable. Continue to monitor.    FEN/GI:  -- NPO on mIVF until improvement in respiratory status.    RENAL:  -- Strict I/Os.    HEMATOLOGIC:  -- No active issues.    INFECTIOUS DISEASE:  -- Viral panel negative. Antibiotics not indicated at this time.    ENDOCRINE:  -- No active issues.    ACCESS: SYLVIA

## 2023-11-22 NOTE — ASU PATIENT PROFILE, ADULT - NS PRO MODE OF ARRIVAL
"Choco Benoit (26 y.o. Male)      REQUESTING INPATIENT AUTHORIZATION:  REF#  [id number]     DEPT: [phone]  [phone]  PAULETTE COLLINS RN,[facility]: NPI [id number] TIN# [id number]       Date of Birth   1997    Social Security Number       Address   [address] [place] [address]    Home Phone   [phone]    MRN   [id number]       Yarsanism   Congregational    Marital Status                               Admission Date   11/21/23    Admission Type   Elective    Admitting Provider   Tr Gonsales MD    Attending Provider   Tr Gonsales MD    Department, Room/Bed   [address], P781/1       Discharge Date       Discharge Disposition       Discharge Destination                                 Attending Provider: Tr Gonsales MD    Allergies: No Known Allergies    Isolation: Contact   Infection: MRSA (11/16/23)   Code Status: CPR    Ht: 180.3 cm (71\")   Wt: 95.3 kg (210 lb 1.6 oz)    Admission Cmt: None   Principal Problem: Septic joint of right knee joint [M00.9]                   Active Insurance as of 11/21/2023       Primary Coverage       Payor Plan Insurance Group Employer/Plan Group    [employer] BLUE CROSS AN[employer]M BLUE CROSS BLUE SHIELD PPO [id number]       Payor Plan Address Payor Plan Phone Number Payor Plan Fax Number Effective Dates    [address] [phone]  4/1/2023 - None Entered    [place] [address]         Subscriber Name Subscriber Birth Date Member ID       CHOCO BENOIT 1997 [id number]                     Emergency Contacts        (Rel.) Home Phone Work Phone Mobile Phone    KEYA BENOIT (Spouse) [phone] -- [phone]                 History & Physical        Tr Gonsales MD at 11/21/23 5298          H&P reviewed. The patient was examined and there are no changes to the H&P.    Electronically signed by Tr Gonsales MD at 11/21/23 2306   Source Note          HPI  Chief " complaint bilateral knee swelling  History present illness: This 26-year-old right-hand-dominant  male works as an  and presents with intermittent swelling of both knees that started about a year ago. It initially started after a long car ride and he has had waxing and waning symptoms ever since. He gets stiffness, soreness, weakness and swelling in the right knee is more affected than the left. Pain with activity is primarily with flexion bending movements. He is use Advil, ice and heat to help control symptoms along with a neoprene sleeve. He has no significant pain with walking. He does have decreased range of motion.  Physical Exam  Right knee:  Skin is normal. Normal alignment.  There is quad atrophy. There is a 3+ effusion. No warmth. No erythema. Normal sensation. Capillary refill is normal.  Range of motion of the knee is 0 to 105 degrees of flexion.  Moderate patella crepitation. The patellofemoral grind test is positive.  Lachman grade 0. Posterior drawer grade 0. Posterior lateral drawer grade 0. Pivot shift grade 0. Valgus grade 0. Varus grade 0.    Left knee:  Skin is normal. Normal alignment.  There is quad atrophy. There is 2+ effusion. No warmth. No erythema. Normal sensation. Capillary refill is normal.  Range of motion of the knee is 0 to 115 degrees of flexion.  Moderate patella crepitation. The patellofemoral grind test is positive.  Lachman grade 0. Posterior drawer grade 0. Posterior lateral drawer grade 0. Pivot shift grade 0. Valgus grade 0. Varus grade 0.  Assessment / Plan  Three-view x-ray bilateral knees shows normal osseous quality and joint space without significant abnormality    Assessment: Patient has recurrent effusions bilateral knees etiology unknown.    1. Effusion of right knee joint  M25.461: Effusion, right knee  XR, KNEE, 3 VIEW    2. Effusion of joint of left knee  M25.462: Effusion, left knee    XR, KNEE, 3 VIEW  Result:  - XRAY KNEE 3 VIEW:  Performed  Patient Instructions  Cultures of the right knee did come back positive for MRSA.  We will plan for right knee irrigation and debridement with postop infectious disease consult.  We will also order blood work.      Electronically signed by Tr Gonsales MD at 11/20/23 0852                 Tr Gonsales MD at 11/20/23 0810          [facility]  Chief complaint bilateral knee swelling  History present illness: This 26-year-old right-hand-dominant  male works as an  and presents with intermittent swelling of both knees that started about a year ago. It initially started after a long car ride and he has had waxing and waning symptoms ever since. He gets stiffness, soreness, weakness and swelling in the right knee is more affected than the left. Pain with activity is primarily with flexion bending movements. He is use Advil, ice and heat to help control symptoms along with a neoprene sleeve. He has no significant pain with walking. He does have decreased range of motion.  Physical Exam  Right knee:  Skin is normal. Normal alignment.  There is quad atrophy. There is a 3+ effusion. No warmth. No erythema. Normal sensation. Capillary refill is normal.  Range of motion of the knee is 0 to 105 degrees of flexion.  Moderate patella crepitation. The patellofemoral grind test is positive.  Lachman grade 0. Posterior drawer grade 0. Posterior lateral drawer grade 0. Pivot shift grade 0. Valgus grade 0. Varus grade 0.    Left knee:  Skin is normal. Normal alignment.  There is quad atrophy. There is 2+ effusion. No warmth. No erythema. Normal sensation. Capillary refill is normal.  Range of motion of the knee is 0 to 115 degrees of flexion.  Moderate patella crepitation. The patellofemoral grind test is positive.  Lachman grade 0. Posterior drawer grade 0. Posterior lateral drawer grade 0. Pivot shift grade 0. Valgus grade 0. Varus grade 0.  Assessment / Plan  Three-view x-ray bilateral knees shows  normal osseous quality and joint space without significant abnormality    Assessment: Patient has recurrent effusions bilateral knees etiology unknown.    1. Effusion of right knee joint  M25.461: Effusion, right knee  XR, KNEE, 3 VIEW    2. Effusion of joint of left knee  M25.462: Effusion, left knee    XR, KNEE, 3 VIEW  Result:  - XRAY KNEE 3 VIEW: Performed  Patient Instructions  Cultures of the right knee did come back positive for MRSA.  We will plan for right knee irrigation and debridement with postop infectious disease consult.  We will also order blood work.      Electronically signed by Tr Gonsales MD at 11/20/23 0802       Emergency Department Notes    No notes of this type exist for this encounter.       Vital Signs (last 2 days)       Date/Time Temp Temp src Pulse Resp BP Patient Position SpO2    11/22/23 1000 97.8 (36.6) Oral 81 16 124/71 Lying 98    11/22/23 0512 97.8 (36.6) Oral 74 16 122/72 Lying 97    11/21/23 2148 97.8 (36.6) Oral 83 16 120/78 Lying 98    11/21/23 1945 -- -- 74 -- 131/73 -- 100    11/21/23 1930 97.6 (36.4) Oral 72 -- 125/95 -- 100    11/21/23 1915 -- -- 70 -- 132/75 -- 98    11/21/23 1900 -- -- 67 -- 129/81 -- 100    11/21/23 1845 -- -- 78 -- 142/74 -- 99    11/21/23 1840 -- -- 65 -- 126/72 -- 99    11/21/23 1836 98.3 (36.8) Oral 79 15 138/83 Lying 99    11/21/23 1400 97.7 (36.5) Oral 83 18 152/101 Sitting 100          Oxygen Therapy (last 2 days)       Date/Time SpO2 Device (Oxygen Therapy) Flow (L/min) Oxygen Concentration (%) ETCO2 (mmHg)    11/22/23 1004 -- room air -- -- --    11/22/23 1000 98 room air -- -- --    11/22/23 0512 97 room air -- -- --    11/22/23 0000 -- room air -- -- --    11/21/23 2148 98 room air -- -- --    11/21/23 1945 100 room air -- -- --    11/21/23 1930 100 room air -- -- --    11/21/23 1915 98 room air -- -- --    11/21/23 1900 100 room air -- -- --    11/21/23 1857 -- nasal cannula 2 -- --    11/21/23 1845 99 nasal cannula 2 -- --    11/21/23  1840 99 nasal cannula 2 -- --    11/21/23 1836 99 nasal cannula 4 -- --    11/21/23 1422 -- room air -- -- --    11/21/23 1400 100 room air -- -- --          Intake & Output (last 2 days)         11/20 0701 11/21 0700 11/21 0701 11/22 0700 11/22 0701 11/23 0700    P.O.  560 480    I.V. (mL/kg)  1000 (10.5)     Total Intake(mL/kg)  1560 (16.4) 480 (5)    Drains  50     Total Output  50     Net  +1510 +480                 Lines, Drains & Airways       Active LDAs       Name Placement date Placement time Site Days    Peripheral IV 11/21/23 1420 Posterior;Right Hand 11/21/23  1420  Hand  less than 1    Peripheral IV 11/22/23 0005 Left Antecubital 11/22/23  0005  Antecubital  less than 1    Closed/Suction Drain Right;Anterior Knee Accordion 10 Fr. 11/21/23  1809  Knee  less than 1    Closed/Suction Drain Left;Anterior Knee Accordion 10 Fr. 11/21/23  1809  Knee  less than 1                  Medication Administration Report for Lamont Crowley as of 11/22/23 1406     Legend:    Given Hold Not Given Due Canceled Entry Other Actions    Time Time (Time) Time Time-Action         Discontinued     Completed     Future     MAR Hold     Linked             Medications 11/21/23 11/22/23      aspirin EC tablet 81 mg  Dose: 81 mg  Freq: Daily Route: PO  Indications Comment: VTE Prophylaxis  Start: 11/22/23 0900   Admin Instructions:   Do not crush or chew the capsules or tablets. The drug may not work as designed if the capsule or tablet is crushed or chewed. Swallow whole.  Do not exceed 4 grams of aspirin in a 24 hr period.    If given for pain, use the following pain scale:   Mild Pain = Pain Score of 1-3, CPOT 1-2  Moderate Pain = Pain Score of 4-6, CPOT 3-4  Severe Pain = Pain Score of 7-10, CPOT 5-8     1004-Given               HYDROcodone-acetaminophen (NORCO)  MG per tablet 1 tablet  Dose: 1 tablet  Freq: Every 4 Hours PRN Route: PO  PRN Reason: Severe Pain  Start: 11/21/23 2036   End: 11/28/23 2035   Admin  Instructions:   Based on patient request - if ordered for moderate or severe pain, provider allows for administration of a medication prescribed for a lower pain scale.  [SNEHAL]    Do not exceed 4 grams of acetaminophen in a 24 hr period. Max dose of 2gm for AST/ALT greater than 120 units/L        If given for pain, use the following pain scale:   Mild Pain = Pain Score of 1-3, CPOT 1-2  Moderate Pain = Pain Score of 4-6, CPOT 3-4  Severe Pain = Pain Score of 7-10, CPOT 5-8         HYDROmorphone (DILAUDID) injection 0.5 mg  Dose: 0.5 mg  Freq: Every 2 Hours PRN Route: IV  PRN Reason: Severe Pain  Start: 11/21/23 2036   End: 11/28/23 2035   Admin Instructions:   Based on patient request - if ordered for moderate or severe pain, provider allows for administration of a medication prescribed for a lower pain scale.  If given for pain, use the following pain scale:  Mild Pain = Pain Score of 1-3, CPOT 1-2  Moderate Pain = Pain Score of 4-6, CPOT 3-4  Severe Pain = Pain Score of 7-10, CPOT 5-8        And  naloxone (NARCAN) injection 0.4 mg  Dose: 0.4 mg  Freq: Every 5 Minutes PRN Route: IV  PRN Reason: Respiratory Depression  Start: 11/21/23 2036   Admin Instructions:   If respiratory rate is less than 8 breaths/minute or patient is difficult to arouse stop any narcotics and contact physician.   Administer slow IV push. Repeat as ordered until patient's respiratory rate is greater than 12 breaths/minute.         lactated ringers infusion  Rate: 9 mL/hr Dose: 9 mL/hr  Freq: Continuous Route: IV  Start: 11/21/23 1445    1445                ondansetron (ZOFRAN) injection 4 mg  Dose: 4 mg  Freq: Every 6 Hours PRN Route: IV  PRN Reason: Nausea  Start: 11/21/23 2036   Admin Instructions:   If BOTH ondansetron (ZOFRAN) and promethazine (PHENERGAN) are ordered use ondansetron first and THEN promethazine IF ondansetron is ineffective.         oxyCODONE-acetaminophen (PERCOCET) 5-325 MG per tablet 1 tablet  Dose: 1 tablet  Freq:  Every 4 Hours PRN Route: PO  PRN Reason: Moderate Pain  Start: 11/21/23 2036   End: 11/28/23 2035   Admin Instructions:   Based on patient request - if ordered for moderate or severe pain, provider allows for administration of a medication prescribed for a lower pain scale.  [SNEHAL]    Do not exceed 4 grams of acetaminophen in a 24 hr period. Max dose of 2gm for AST/ALT greater than 120 units/L        If given for pain, use the following pain scale:   Mild Pain = Pain Score of 1-3, CPOT 1-2  Moderate Pain = Pain Score of 4-6, CPOT 3-4  Severe Pain = Pain Score of 7-10, CPOT 5-8     1005-Given               Pharmacy to dose vancomycin  Freq: Continuous PRN Route: XX  PRN Reason: Consult  Indications of Use: BONE AND/OR JOINT INFECTION  Start: 11/21/23 2053   End: 12/19/23 2052         vancomycin 1250 mg/250 mL 0.9% NS IVPB ([facility])  Dose: 1,250 mg  Freq: Every 12 Hours Route: IV  Indications of Use: BONE AND/OR JOINT INFECTION  Start: 11/22/23 0000   End: 11/24/23 2359     0006-New Bag     1207-New Bag             Completed Medications  Medications 11/21/23 11/22/23       acetaminophen (TYLENOL) tablet 1,000 mg  Dose: 1,000 mg  Freq: Once Route: PO  Start: 11/21/23 1445   End: 11/21/23 1549   Admin Instructions:   If given for fever, use fever parameter: fever greater than 100.4 °F  Based on patient request - if ordered for moderate or severe pain, provider allows for administration of a medication prescribed for a lower pain scale.    Do not exceed 4 grams of acetaminophen in a 24 hr period. Max dose of 2gm for AST/ALT greater than 120 units/L.    If given for pain, use the following pain scale:   Mild Pain = Pain Score of 1-3, CPOT 1-2  Moderate Pain = Pain Score of 4-6, CPOT 3-4  Severe Pain = Pain Score of 7-10, CPOT 5-8    1549-Given                midazolam (VERSED) injection 1 mg  Dose: 1 mg  Freq: Every 5 Minutes PRN Route: IV  PRN Comment: Anxiety prophylaxis, Pre-op comfort  Start: 11/21/23 1443   End: 11/21/23  1604   Admin Instructions:   May repeat dose in 5 minutes one time then contact provider for additional orders.        1554-Given     1604-Given               vancomycin IVPB 1500 mg in 0.9% NaCl (Premix) 500 mL  Dose: 1,500 mg  Freq: Once Route: IV  Indications of Use: PERIOPERATIVE PHARMACOPROPHYLAXIS  Start: 11/21/23 1526   End: 11/21/23 1716    1546-New Bag               Discontinued Medications  Medications 11/21/23 11/22/23       bupivacaine-EPINEPHrine PF (MARCAINE w/EPI) 0.5% [id number] injection  Freq: As Needed  Start: 11/21/23 1741   End: 11/21/23 1834    1740-Given     1741-Given               cefTRIAXone (ROCEPHIN) 1,000 mg in sodium chloride 0.9 % 100 mL IVPB-VTB  Dose: 1,000 mg  Freq: Every 24 Hours Route: IV  Indications of Use: BONE AND/OR JOINT INFECTION  Start: 11/21/23 2130   End: 11/22/23 1201   Admin Instructions:   LR should be paused and flushing of the line with NS is recommended prior to and after completion of ceftriaxone infusion due to incompatibility. Do not co-adminster with calcium-containing solutions.  Caution: Look alike/sound alike drug alert     0005-New Bag               diphenhydrAMINE (BENADRYL) injection 12.5 mg  Dose: 12.5 mg  Freq: Every 15 Minutes PRN Route: IV  PRN Reason: Itching  PRN Comment: May repeat x 1  Start: 11/21/23 1844   End: 11/21/23 2033   Admin Instructions:   Caution: Look alike/sound alike drug alert. This med may be ordered in other forms and routes. Before giving verify the last time the drug was given by any route/form.         droperidol (INAPSINE) injection 0.625 mg  Dose: 0.625 mg  Freq: Every 20 Minutes PRN Route: IV  PRN Reasons: Nausea,Vomiting  Indications of Use: NAUSEA AND VOMITING  Start: 11/21/23 1844   End: 11/21/23 2033   Admin Instructions:   Use ondansetron first; proceed to droperidol for nausea refractory to ondansetron.        Or  droperidol (INAPSINE) injection 0.625 mg  Dose: 0.625 mg  Freq: Every 20 Minutes PRN Route: IM  PRN  Reasons: Nausea,Vomiting  Start: 11/21/23 1844   End: 11/21/23 2033   Admin Instructions:   Use ondansetron first; proceed to droperidol for nausea refractory to ondansetron.         ePHEDrine injection 5 mg  Dose: 5 mg  Freq: Once As Needed Route: IV  PRN Comment: symptomatic hypotension - Notify attending anesthesiologist if this needs to be given  Start: 11/21/23 1844   End: 11/21/23 2033   Admin Instructions:   Caution: Look alike/sound alike drug alert   Dilute with NS to 5-10 mg/mL.  Central line preferred, if unavailable use large bore IV access with frequent nurse monitoring of IV site.         fentaNYL citrate (PF) (SUBLIMAZE) injection 50 mcg  Dose: 50 mcg  Freq: Every 5 Minutes PRN Route: IV  PRN Reason: Severe Pain  Start: 11/21/23 1844   End: 11/21/23 2033   Admin Instructions:   Maximum total dose of fentanyl is 200 mcg.  If given for pain, use the following pain scale:  Mild Pain = Pain Score of 1-3, CPOT 1-2  Moderate Pain = Pain Score of 4-6, CPOT 3-4  Severe Pain = Pain Score of 7-10, CPOT 5-8         flumazenil (ROMAZICON) injection 0.2 mg  Dose: 0.2 mg  Freq: As Needed Route: IV  PRN Comment: for benzodiazepine induced unresponsiveness or sedation  Indications of Use: BENZODIAZEPINE-INDUCED SEDATION  Start: 11/21/23 1844   End: 11/21/23 2033   Admin Instructions:   Notify Anesthesia if given  ** give IV over 15-30 seconds **         hydrALAZINE (APRESOLINE) injection 5 mg  Dose: 5 mg  Freq: Every 10 Minutes PRN Route: IV  PRN Reason: High Blood Pressure  PRN Comment: for systolic blood pressure greater than 180 mmHg or diastolic blood pressure greater than 105 mmHg  Start: 11/21/23 1844   End: 11/21/23 2033   Admin Instructions:   Up to 20 mg. If labetalol and hydralazine are both ordered, use labetalol first.  Caution: Look alike/sound alike drug alert         HYDROcodone-acetaminophen (NORCO) 5-325 MG per tablet 1 tablet  Dose: 1 tablet  Freq: Once As Needed Route: PO  PRN Reason: Moderate  Pain  Start: 11/21/23 1844   End: 11/21/23 2033   Admin Instructions:   Based on patient request - if ordered for moderate or severe pain, provider allows for administration of a medication prescribed for a lower pain scale.  [SNEHAL]    Do not exceed 4 grams of acetaminophen in a 24 hr period. Max dose of 2gm for AST/ALT greater than 120 units/L        If given for pain, use the following pain scale:   Mild Pain = Pain Score of 1-3, CPOT 1-2  Moderate Pain = Pain Score of 4-6, CPOT 3-4  Severe Pain = Pain Score of 7-10, CPOT 5-8         HYDROmorphone (DILAUDID) injection 0.5 mg  Dose: 0.5 mg  Freq: Every 5 Minutes PRN Route: IV  PRN Reason: Moderate Pain  Start: 11/21/23 1844   End: 11/21/23 2033   Admin Instructions:   Maximum total dose of hydromorphone is 2 mg.  If given for pain, use the following pain scale:  Mild Pain = Pain Score of 1-3, CPOT 1-2  Moderate Pain = Pain Score of 4-6, CPOT 3-4  Severe Pain = Pain Score of 7-10, CPOT 5-8         ipratropium-albuterol (DUO-NEB) nebulizer solution 3 mL  Dose: 3 mL  Freq: Once As Needed Route: NEBULIZATION  PRN Reasons: Wheezing,Shortness of Air  PRN Comment: bronchospasm  Start: 11/21/23 1844   End: 11/21/23 2033   Admin Instructions:   Notify Anesthesia if minineb is given         labetalol (NORMODYNE,TRANDATE) injection 5 mg  Dose: 5 mg  Freq: Every 5 Minutes PRN Route: IV  PRN Reason: High Blood Pressure  PRN Comment: for systolic blood pressure greater than 180 mmHg or diastolic blood pressure greater than 105 mmHg  Start: 11/21/23 1844   End: 11/21/23 2033   Admin Instructions:   Hold for heart rate less than 60.    If labetalol and hydralazine are both ordered, use labetalol first. Maximum dose of labetalol is 20mg IV while in PACU, notify anesthesiologist for further orders if needed.  Give IV Push over 2 minutes.         lactated Ringer's irrigation solution  Freq: As Needed  Start: 11/21/23 1742   End: 11/21/23 1834    1742-Given     1812-Given                lidocaine (XYLOCAINE) 1 % injection 0.5 mL  Dose: 0.5 mL  Freq: Once As Needed Route: ID  PRN Comment: IV Start  Start: 11/21/23 1443   End: 11/21/23 2000         naloxone (NARCAN) injection 0.2 mg  Dose: 0.2 mg  Freq: As Needed Route: IV  PRN Reasons: Opioid Reversal,Respiratory Depression  PRN Comment: unresponsiveness, decrease oxygen saturation  Indications of Use: ACUTE RESPIRATORY FAILURE,OPIOID-INDUCED RESPIRATORY DEPRESSION  Start: 11/21/23 1844   End: 11/21/23 2033   Admin Instructions:   Notify Anesthesia if given         ondansetron (ZOFRAN) injection 4 mg  Dose: 4 mg  Freq: Once As Needed Route: IV  PRN Reasons: Nausea,Vomiting  Indications of Use: POSTOPERATIVE NAUSEA AND VOMITING  Start: 11/21/23 1844   End: 11/21/23 2033   Admin Instructions:   If BOTH ondansetron (ZOFRAN) and promethazine (PHENERGAN) are ordered use ondansetron first and THEN promethazine IF ondansetron is ineffective.         oxyCODONE-acetaminophen (PERCOCET) 7.5-325 MG per tablet 1 tablet  Dose: 1 tablet  Freq: Every 4 Hours PRN Route: PO  PRN Reason: Severe Pain  Start: 11/21/23 1844   End: 11/21/23 2033   Admin Instructions:   [SNEHAL]    Do not exceed 4 grams of acetaminophen in a 24 hr period. Max dose of 2gm for AST/ALT greater than 120 units/L        If given for pain, use the following pain scale:   Mild Pain = Pain Score of 1-3, CPOT 1-2  Moderate Pain = Pain Score of 4-6, CPOT 3-4  Severe Pain = Pain Score of 7-10, CPOT 5-8    1857-Given                promethazine (PHENERGAN) suppository 25 mg  Dose: 25 mg  Freq: Once As Needed Route: RE  PRN Reasons: Nausea,Vomiting  Start: 11/21/23 1844   End: 11/21/23 2033   Admin Instructions:   If BOTH ondansetron (ZOFRAN) and promethazine (PHENERGAN) are ordered use ondansetron first and THEN promethazine IF ondansetron is ineffective.        Or  promethazine (PHENERGAN) tablet 25 mg  Dose: 25 mg  Freq: Once As Needed Route: PO  PRN Reasons: Nausea,Vomiting  Start: 11/21/23 4461    End: 11/21/23 2033   Admin Instructions:   If BOTH ondansetron (ZOFRAN) and promethazine (PHENERGAN) are ordered use ondansetron first and THEN promethazine IF ondansetron is ineffective.             sodium chloride 0.9 % flush 3 mL  Dose: 3 mL  Freq: Every 12 Hours Scheduled Route: IV  Start: 11/21/23 1445   End: 11/21/23 2000 1445                sodium chloride 0.9 % flush 3-10 mL  Dose: 3-10 mL  Freq: As Needed Route: IV  PRN Reason: Line Care  Start: 11/21/23 1443   End: 11/21/23 2000         vancomycin (VANCOCIN) injection 1,500 mg  Dose: 1,500 mg  Freq: Once Route: IV  Indications of Use: PERIOPERATIVE PHARMACOPROPHYLAXIS  Start: 11/21/23 1507   End: 11/21/23 1524    1507                vancomycin IVPB 1500 mg in 0.9% NaCl (Premix) 500 mL  Dose: 15 mg/kg  Weight Dosing Info: 95.3 kg  Freq: Every 12 Hours Route: IV  Indications of Use: BONE AND/OR JOINT INFECTION  Start: 11/22/23 0000   End: 11/21/23 2054   Admin Instructions:   Pharmacist to schedule 12 hours from pre-op dose.     Order specific questions:   SCIP Justification for use: Documentation of MRSA Colonization               Operative/Procedure Notes (all)        Tr Gonsales MD at 11/21/23 1736  Version 1 of 1         .KNEE ARTHROSCOPY  Procedure Note    Lamont Crowley  11/21/2023    Pre-op Diagnosis:   Septic arthritis bilateral knees    Post-op diagnosis:  Post-Op Diagnosis Codes:  Septic arthritis bilateral knees    Procedure(s):  BILATERAL KNEE ARTHROSCOPY WITH IRRIGATION AND DEBRIDEMENT    Surgeon(s):  Tr Gonsales MD    Anesthesia: General  Anesthesiologist: Jase Palmer MD    Staff:   Circulator: Alyssa Lloyd RN; Jesusita Burnham RN; Mirza Montejo RN  Scrub Person: Michael Carmen  Vendor Representative: Obi Meeks    Estimated Blood Loss: minimal    Specimens: * No orders in the log *      Findings: See Dictation    Complications: None    Procedure: The patient was taken to the operative suite.  After adequate  anesthesia was established the right lower extremity was prepped and draped in the usual fashion.  Simultaneously the left lower extremity was also prepped and draped in the usual fashion.  The right leg was elevated, and tourniquet inflated to 250 mmHg.  An inferior lateral portal was made and the arthroscope was introduced into the knee.  Under direct visualization an inferior medial portal was made and diagnostic arthroscopy commenced with the following findings.  Serial inflammatory fluid was encountered.  There was synovial inflammation noted and chondromalacia patella.  Overall the articular cartilage on the medial and lateral compartments and trochlea were generally intact.  Both the medial and lateral menisci were intact.  The anterior and posterior cruciate ligaments were intact.  Synovitic reactive tissues were noted and he had a preoperative diagnosis of MRSA infection vigorous irrigation debridement and suctioning was carried out and dilute Betadine was also introduced into the knee joint.  Once this was complete a drain was placed through the inferior medial portal and out the superior lateral thigh.  Once the drain was introduced it was sutured into place.  After vigorous irrigation with multiple liters of fluid the arthroscopic instruments were removed and the portals closed with 4-0 nylon.  At the completion of the right knee arthroscopy after tourniquet was released the left knee was elevated and the tourniquet was inflated to 250 mmHg.  An inferolateral portal was made and the arthroscope was introduced into the knee.  Under direct visualization an inferomedial portal was made and diagnostic arthroscopy commenced.  0 inflammatory fluid was encountered along with synovitic reactive tissues and fibrinous material within the knee joint.  This was vigorously irrigated suction and debrided systematically through each compartment of the knee joint.  There was an anterior horn lateral meniscus tear and  partial lateral meniscectomy was performed.  The ACL PCL and medial compartment tissues were intact.  After a vigorous and systematic debridement and washout dilute Betadine was introduced into the knee and further washed out.  I then placed a drain similar to the contralateral knee and sutured into place.  The portals were closed with 4-0 nylon after the instruments had been removed.  A sterile compression dressing was applied and the tourniquet was released.    The portal sites were injected with half percent Marcaine with epinephrine prior to dressing placement.  A sterile compression dressing was applied and tourniquet was released.  Patient was awoken and taken to the postanesthetic recovery unit in good condition.      Tr Gonsales MD     Date: 2023  Time: 19:05 EST    Electronically signed by Tr Gonsales MD at 23       Physician Progress Notes (all)    No notes of this type exist for this encounter.          Consult Notes (all)        Bo Berry MD at 23 1028        Consult Orders    1. Inpatient Infectious Diseases Consult [[id number]] ordered by Tr Gonsales MD at 23 1903                 CONSULT NOTE    Infectious Diseases - Bo Becerril MD  [facility]       Patient Identification:  Name: Lamont Crowley  Age: 26 y.o.  Sex: male  :  1997  MRN: [id number]             Date of Consultation: 2023      Primary Care Physician: Tr Shaw MD                               Requesting Physician: Dr. Gonsales  Reason for Consultation: Bilateral septic arthritis of native knee with joint fluid culture positive for MRSA from 11/15/2023.    Impression: Patient is a 26-year-old male who is otherwise healthy has been battling with off-and-on swelling and discomfort of the right knee since last year started during a long drive.  In this background few months ago he started noticing left knee started showing intermittent swelling and discomfort.   Up to 3 weeks ago patient was having these episodes in which his both knees at times get swollen and he gets stiffness and pain and with local care such as rest wrapping and elevation his discomfort goes away and is become functional again such as being able to play volleyball pickleball and any kind of activity.  Patient denies any new pain or discomfort such as neck pain back pain hip pain shoulder pain or knee pain.  He does not recall any episodes of fever chills or night sweats until 3 weeks ago when he started having these episodes.  Despite swelling of his knees and night sweats in the last 3 weeks he had preserved appetite.  Patient was eventually seen by Dr. Gonsales and evaluation included joint fluid aspiration which showed finding consistent with inflammatory process with crystal studies negative and right knee joint fluid culture come back positive for MRSA.  Patient is brought in to the hospital on 11/21/2023 and underwent arthroscopic washout of bilateral knee.  Patient has been appropriately started on vancomycin and ceftriaxone and infectious disease service is consulted.  Patient does not recall any obvious damage injury or lesions on his knees or anywhere else and as mentioned above does not recall episodes in which she had unexplained fever or chills.  1-bilateral native knee joint septic arthritis with right knee joint fluid culture positive for MRSA suggestive of transient MRSA bacteremic process in the past with eventual seeding to the joint with symptom taking turn towards worse in the last 3 weeks based on clinical description of his symptoms.  2-status post bilateral arthroscopic knee joint washout on 11/21/2023.    Recommendations/Discussions:  At this juncture to complete the workup I will do blood cultures even though patient has received antibiotic treatment to make sure that he is not bacteremic.  Going forward patient will require exhaustive review of system on periodic basis to  identify other areas of secondary seeding due to Staph aureus and specifically need to be question about evolving general sense of ill health, night sweats fever and chills as well as any localizing new pain or discomfort such as neck pain back pain hip pain shoulder pain or knee pain.  Would recommend 4 weeks of IV vancomycin pharmacy to dose to achieve a trough level of 15-20 or area under the curve of 400-600.  I have explained side effects of antibiotic therapy such as nausea vomiting diarrhea rash potential for hepatic and renal dysfunction cytopenias and drug interaction and selection of resistant pathogens as well as yeast and C. difficile infection.  Patient understands that antibiotic therapy is needed and side effect risk need to be understood as benefit of treatment far outweighs the potential risks.  Patient is willing to proceed with antibiotic therapy.  Side effects of long-term IV access such as PICC line or midline discussed with the patient including limitation of activity, blood clots, secondary infection.  Patient understands the need for IV antibiotic therapy and accepts the potential risks that could occur from the PICC line use.  Patient is educated to call me at [phone] on a weekly basis to go over review system and monitor antibiotic toxicity.  Following orders are written for case management:  Please arrange for 4 weeks of IV vancomycin to be dosed by pharmacy to achieve a trough level of 15-20 or area under the curve of 400-600 with start of the treatment would be 11/22/2023.  Check weekly CBC CMP and CRP and call abnormal results to Dr. Berry at [phone].  Remove midline/PICC line once antibiotics are finished.  Please educate patient to call Dr. Berry at [phone] on a weekly basis to go over review systems and monitor antibiotic toxicity.  Diagnosis: Bilateral knee joint septic arthritis likely due to hematogenous seeding from occult bacteremia.        History of Present Illness:    Patient is a 26-year-old male who is otherwise healthy has been battling with off-and-on swelling and discomfort of the right knee since last year started during a long drive.  In this background few months ago he started noticing left knee started showing intermittent swelling and discomfort.  Up to 3 weeks ago patient was having these episodes in which his both knees at times get swollen and he gets stiffness and pain and with local care such as rest wrapping and elevation his discomfort goes away and is become functional again such as being able to play volleyball pickleball and any kind of activity.  Patient denies any new pain or discomfort such as neck pain back pain hip pain shoulder pain or knee pain.  He does not recall any episodes of fever chills or night sweats until 3 weeks ago when he started having these episodes.  Despite swelling of his knees and night sweats in the last 3 weeks he had preserved appetite.  Patient was eventually seen by Dr. Gonsales and evaluation included joint fluid aspiration which showed finding consistent with inflammatory process with crystal studies negative and right knee joint fluid culture come back positive for MRSA.  Patient is brought in to the hospital on 11/21/2023 and underwent arthroscopic washout of bilateral knee.  Patient has been appropriately started on vancomycin and ceftriaxone and infectious disease service is consulted.  Patient does not recall any obvious damage injury or lesions on his knees or anywhere else and as mentioned above does not recall episodes in which she had unexplained fever or chills.      Past Medical History:  Past Medical History:   Diagnosis Date    Knee pain      Past Surgical History:  Past Surgical History:   Procedure Laterality Date    HUMERUS FRACTURE SURGERY Bilateral     ELBOWS      Home Meds:  Medications Prior to Admission   Medication Sig Dispense Refill Last Dose    ibuprofen (ADVIL,MOTRIN) 200 MG tablet Take 1 tablet by  "mouth Every 6 (Six) Hours As Needed for Mild Pain.   Past Week     Current Meds:     Current Facility-Administered Medications:     aspirin EC tablet 81 mg, 81 mg, Oral, Daily, Tr Gonsales MD, 81 mg at 11/22/23 1004    cefTRIAXone (ROCEPHIN) 1,000 mg in sodium chloride 0.9 % 100 mL IVPB-VTB, 1,000 mg, Intravenous, Q24H, Tr Gonsales MD, Last Rate: 200 mL/hr at 11/22/23 0005, 1,000 mg at 11/22/23 0005    HYDROcodone-acetaminophen (NORCO)  MG per tablet 1 tablet, 1 tablet, Oral, Q4H PRN, Tr Gonsales MD    HYDROmorphone (DILAUDID) injection 0.5 mg, 0.5 mg, Intravenous, Q2H PRN **AND** naloxone (NARCAN) injection 0.4 mg, 0.4 mg, Intravenous, Q5 Min PRN, rT Gonsales MD    lactated ringers infusion, 9 mL/hr, Intravenous, Continuous, Jase Palmer MD    ondansetron (ZOFRAN) injection 4 mg, 4 mg, Intravenous, Q6H PRN, Tr Gonsales MD    oxyCODONE-acetaminophen (PERCOCET) 5-325 MG per tablet 1 tablet, 1 tablet, Oral, Q4H PRN, Tr Gonsales MD, 1 tablet at 11/22/23 1005    Pharmacy to dose vancomycin, , Does not apply, Continuous PRN, Tr Gonsales MD    vancomycin 1250 mg/250 mL 0.9% NS IVPB ([facility]), 1,250 mg, Intravenous, Q12H, Tr Gonsales MD, 1,250 mg at 11/22/23 0006  Allergies:  No Known Allergies  Social History:   Social History     Tobacco Use    Smoking status: Never    Smokeless tobacco: Never   Substance Use Topics    Alcohol use: Yes     Comment: SPECIAL OCCASIONS      Family History:  Family History   Problem Relation Age of Onset    Malig Hyperthermia Neg Hx           Review of Systems  See history of present illness and past medical history.        Vitals:   /71 (BP Location: Left arm, Patient Position: Lying)   Pulse 81   Temp 97.8 °F (36.6 °C) (Oral)   Resp 16   Ht 180.3 cm (71\")   Wt 95.3 kg (210 lb 1.6 oz)   SpO2 98%   BMI 29.30 kg/m²   I/O:   Intake/Output Summary (Last 24 hours) at 11/22/2023 1028  Last data filed at 11/22/2023 0835  Gross per 24 hour "   Intake 1800 ml   Output 50 ml   Net 1750 ml     Exam:  Patient is examined using the personal protective equipment as per guidelines from infection control for this particular patient as enacted.  Hand washing was performed before and after patient interaction.  General Appearance:    Alert, cooperative, no distress, appears stated age   Head:    Normocephalic, without obvious abnormality, atraumatic   Eyes:    PERRL, conjunctivae/corneas clear, EOM's intact, both eyes   Ears:    Normal external ear canals, both ears   Nose:   Nares normal, septum midline, mucosa normal, no drainage    or sinus tenderness   Throat:   Lips, tongue, gums normal; oral mucosa pink and moist   Neck:   Supple, symmetrical, trachea midline, no adenopathy;     thyroid:  no enlargement/tenderness/nodules; no carotid    bruit or JVD   Back:     Symmetric, no curvature, ROM normal, no CVA tenderness   Lungs:     Clear to auscultation bilaterally, respirations unlabored   Chest Wall:    No tenderness or deformity    Heart:    Regular rate and rhythm, S1 and S2 normal, no murmur, rub   or gallop   Abdomen:     Soft, non-tender, bowel sounds active all four quadrants,     no masses, no hepatomegaly, no splenomegaly   Extremities: Bilateral knee arthroscopic drainage site dressed with Hemovac drain in place   Pulses:   Pulses palpable in all extremities; symmetric all extremities   Skin:   Skin color normal, Skin is warm and dry,  no rashes or palpable lesions   Neurologic:   CNII-XII intact, motor strength grossly intact, sensation grossly intact to light touch, no focal deficits noted       Data Review:    I reviewed the patient's new clinical results.  Results from last 7 days   Lab Units 11/20/23  0940   WBC 10*3/mm3 8.69   HEMOGLOBIN g/dL 13.5   PLATELETS 10*3/mm3 347     Results from last 7 days   Lab Units 11/20/23  0940   SODIUM mmol/L 137   POTASSIUM mmol/L 4.3   CHLORIDE mmol/L 100   CO2 mmol/L 24.8   BUN mg/dL 13   CREATININE mg/dL  0.92   CALCIUM mg/dL 9.8   GLUCOSE mg/dL 108*     Microbiology Results (last 10 days)       Procedure Component Value - Date/Time    Body Fluid Culture - Body Fluid, Knee, Left [[id number]] Collected: 11/20/23 0940    Lab Status: Preliminary result Specimen: Body Fluid from Knee, Left Updated: 11/22/23 0616     Body Fluid Culture No growth at 2 days     Gram Stain Moderate (3+) WBCs per low power field      No organisms seen    Body Fluid Culture - Body Fluid, Knee, Right [[id number]]  (Abnormal)  (Susceptibility) Collected: 11/15/23 1108    Lab Status: Final result Specimen: Body Fluid from Knee, Right Updated: 11/17/23 0756     Body Fluid Culture Scant growth (1+) Staphylococcus aureus, MRSA     Comment:   Methicillin resistant Staphylococcus aureus, Patient may be an isolation risk.        Gram Stain WBCs seen      No organisms seen    Susceptibility        Staphylococcus aureus, MRSA      BALBIR      Gentamicin Susceptible      Oxacillin Resistant      Rifampin Susceptible      Vancomycin Susceptible                       Susceptibility Comments       Staphylococcus aureus, MRSA    This isolate does not demonstrate inducible clindamycin resistance in vitro.                       No radiology results for the last 30 days.      Assessment:  Active Hospital Problems    Diagnosis  POA    **Septic joint of right knee joint [M00.9]  Yes      Resolved Hospital Problems   No resolved problems to display.         Plan:  See above  Bo Berry MD   11/22/2023  10:28 EST    Parts of this note may be an electronic transcription/translation of spoken language to printed text using the Dragon dictation system.      Electronically signed by Bo Berry MD at 11/22/23 4822        ambulatory

## 2023-12-18 ENCOUNTER — APPOINTMENT (OUTPATIENT)
Dept: CARDIOLOGY | Facility: CLINIC | Age: 68
End: 2023-12-18

## 2024-01-23 ENCOUNTER — RX RENEWAL (OUTPATIENT)
Age: 69
End: 2024-01-23

## 2024-05-01 ENCOUNTER — RX RENEWAL (OUTPATIENT)
Age: 69
End: 2024-05-01

## 2024-06-21 RX ORDER — ROSUVASTATIN CALCIUM 10 MG/1
10 TABLET, FILM COATED ORAL
Qty: 30 | Refills: 0 | Status: ACTIVE | COMMUNITY
Start: 2023-06-19 | End: 1900-01-01

## 2024-06-21 RX ORDER — IRBESARTAN 300 MG/1
300 TABLET ORAL
Qty: 30 | Refills: 0 | Status: ACTIVE | COMMUNITY
Start: 2023-06-27 | End: 1900-01-01

## 2024-06-21 RX ORDER — CHLORTHALIDONE 25 MG/1
25 TABLET ORAL
Qty: 30 | Refills: 0 | Status: ACTIVE | COMMUNITY
Start: 2023-06-27 | End: 1900-01-01

## 2024-06-23 ENCOUNTER — RX RENEWAL (OUTPATIENT)
Age: 69
End: 2024-06-23

## 2024-07-22 ENCOUNTER — APPOINTMENT (OUTPATIENT)
Dept: CARDIOLOGY | Facility: CLINIC | Age: 69
End: 2024-07-22

## 2024-11-04 ENCOUNTER — APPOINTMENT (OUTPATIENT)
Dept: INTERNAL MEDICINE | Facility: CLINIC | Age: 69
End: 2024-11-04
Payer: COMMERCIAL

## 2024-11-04 ENCOUNTER — LABORATORY RESULT (OUTPATIENT)
Age: 69
End: 2024-11-04

## 2024-11-04 ENCOUNTER — NON-APPOINTMENT (OUTPATIENT)
Age: 69
End: 2024-11-04

## 2024-11-04 VITALS
BODY MASS INDEX: 27.36 KG/M2 | SYSTOLIC BLOOD PRESSURE: 120 MMHG | HEART RATE: 52 BPM | HEIGHT: 72 IN | WEIGHT: 202 LBS | OXYGEN SATURATION: 99 % | DIASTOLIC BLOOD PRESSURE: 80 MMHG

## 2024-11-04 DIAGNOSIS — I10 ESSENTIAL (PRIMARY) HYPERTENSION: ICD-10-CM

## 2024-11-04 DIAGNOSIS — E78.5 HYPERLIPIDEMIA, UNSPECIFIED: ICD-10-CM

## 2024-11-04 DIAGNOSIS — D32.0 BENIGN NEOPLASM OF CEREBRAL MENINGES: ICD-10-CM

## 2024-11-04 DIAGNOSIS — R79.89 OTHER SPECIFIED ABNORMAL FINDINGS OF BLOOD CHEMISTRY: ICD-10-CM

## 2024-11-04 PROCEDURE — 99204 OFFICE O/P NEW MOD 45 MIN: CPT

## 2024-11-04 PROCEDURE — 93000 ELECTROCARDIOGRAM COMPLETE: CPT

## 2024-11-05 PROBLEM — R79.89 ABNORMAL CBC: Status: ACTIVE | Noted: 2024-11-05

## 2024-11-05 LAB
ALBUMIN SERPL ELPH-MCNC: 4.3 G/DL
ALP BLD-CCNC: 77 U/L
ALT SERPL-CCNC: 15 U/L
ANION GAP SERPL CALC-SCNC: 14 MMOL/L
APPEARANCE: CLEAR
AST SERPL-CCNC: 32 U/L
BACTERIA: NEGATIVE /HPF
BASOPHILS # BLD AUTO: 0 K/UL
BASOPHILS NFR BLD AUTO: 0 %
BILIRUB DIRECT SERPL-MCNC: 0.1 MG/DL
BILIRUB INDIRECT SERPL-MCNC: 0.4 MG/DL
BILIRUB SERPL-MCNC: 0.5 MG/DL
BILIRUBIN URINE: NEGATIVE
BLOOD URINE: NEGATIVE
BUN SERPL-MCNC: 22 MG/DL
CALCIUM SERPL-MCNC: 10.3 MG/DL
CAST: 0 /LPF
CHLORIDE SERPL-SCNC: 101 MMOL/L
CHOLEST SERPL-MCNC: 161 MG/DL
CO2 SERPL-SCNC: 25 MMOL/L
COLOR: YELLOW
CREAT SERPL-MCNC: 1.41 MG/DL
EGFR: 53 ML/MIN/1.73M2
EOSINOPHIL # BLD AUTO: 0.79 K/UL
EOSINOPHIL NFR BLD AUTO: 5.3 %
EPITHELIAL CELLS: 0 /HPF
ESTIMATED AVERAGE GLUCOSE: 123 MG/DL
GLUCOSE QUALITATIVE U: NEGATIVE MG/DL
GLUCOSE SERPL-MCNC: 90 MG/DL
HBA1C MFR BLD HPLC: 5.9 %
HCT VFR BLD CALC: 47 %
HDLC SERPL-MCNC: 49 MG/DL
HGB BLD-MCNC: 15.6 G/DL
KETONES URINE: NEGATIVE MG/DL
LDLC SERPL CALC-MCNC: 93 MG/DL
LEUKOCYTE ESTERASE URINE: NEGATIVE
LYMPHOCYTES # BLD AUTO: 7.66 K/UL
LYMPHOCYTES NFR BLD AUTO: 51.3 %
MAN DIFF?: NORMAL
MCHC RBC-ENTMCNC: 28.1 PG
MCHC RBC-ENTMCNC: 33.2 G/DL
MCV RBC AUTO: 84.5 FL
MICROSCOPIC-UA: NORMAL
MONOCYTES # BLD AUTO: 1.85 K/UL
MONOCYTES NFR BLD AUTO: 12.4 %
NEUTROPHILS # BLD AUTO: 3.97 K/UL
NEUTROPHILS NFR BLD AUTO: 26.6 %
NITRITE URINE: NEGATIVE
NONHDLC SERPL-MCNC: 112 MG/DL
PH URINE: 6
PLATELET # BLD AUTO: 214 K/UL
POTASSIUM SERPL-SCNC: 4.3 MMOL/L
PROT SERPL-MCNC: 7.1 G/DL
PROTEIN URINE: NEGATIVE MG/DL
PSA SERPL-MCNC: 1.99 NG/ML
RBC # BLD: 5.56 M/UL
RBC # FLD: 14.4 %
RED BLOOD CELLS URINE: 0 /HPF
SODIUM SERPL-SCNC: 140 MMOL/L
SPECIFIC GRAVITY URINE: 1.02
TRIGL SERPL-MCNC: 105 MG/DL
TSH SERPL-ACNC: 1.11 UIU/ML
UROBILINOGEN URINE: 0.2 MG/DL
WBC # FLD AUTO: 14.94 K/UL
WHITE BLOOD CELLS URINE: 0 /HPF

## 2024-11-15 ENCOUNTER — LABORATORY RESULT (OUTPATIENT)
Age: 69
End: 2024-11-15

## 2024-11-16 LAB
ALBUMIN SERPL ELPH-MCNC: 4.6 G/DL
ALP BLD-CCNC: 85 U/L
ALT SERPL-CCNC: 22 U/L
ANION GAP SERPL CALC-SCNC: 12 MMOL/L
AST SERPL-CCNC: 32 U/L
BASOPHILS # BLD AUTO: 0.11 K/UL
BASOPHILS NFR BLD AUTO: 0.7 %
BILIRUB SERPL-MCNC: 0.6 MG/DL
BUN SERPL-MCNC: 18 MG/DL
CALCIUM SERPL-MCNC: 10.2 MG/DL
CHLORIDE SERPL-SCNC: 100 MMOL/L
CO2 SERPL-SCNC: 27 MMOL/L
CREAT SERPL-MCNC: 1.31 MG/DL
EGFR: 58 ML/MIN/1.73M2
EOSINOPHIL # BLD AUTO: 0.58 K/UL
EOSINOPHIL NFR BLD AUTO: 3.9 %
GLUCOSE SERPL-MCNC: 93 MG/DL
HCT VFR BLD CALC: 47.3 %
HGB BLD-MCNC: 15.6 G/DL
IMM GRANULOCYTES NFR BLD AUTO: 0.3 %
LYMPHOCYTES # BLD AUTO: 9.12 K/UL
LYMPHOCYTES NFR BLD AUTO: 61.2 %
MAN DIFF?: NORMAL
MCHC RBC-ENTMCNC: 28.5 PG
MCHC RBC-ENTMCNC: 33 G/DL
MCV RBC AUTO: 86.3 FL
MONOCYTES # BLD AUTO: 1.02 K/UL
MONOCYTES NFR BLD AUTO: 6.9 %
NEUTROPHILS # BLD AUTO: 4.02 K/UL
NEUTROPHILS NFR BLD AUTO: 27 %
PLATELET # BLD AUTO: 200 K/UL
POTASSIUM SERPL-SCNC: 4.6 MMOL/L
PROT SERPL-MCNC: 6.7 G/DL
RBC # BLD: 5.48 M/UL
RBC # FLD: 13.7 %
SODIUM SERPL-SCNC: 139 MMOL/L
WBC # FLD AUTO: 14.89 K/UL

## 2024-11-18 ENCOUNTER — APPOINTMENT (OUTPATIENT)
Dept: CARDIOLOGY | Facility: CLINIC | Age: 69
End: 2024-11-18

## 2025-02-03 ENCOUNTER — APPOINTMENT (OUTPATIENT)
Dept: INTERNAL MEDICINE | Facility: CLINIC | Age: 70
End: 2025-02-03

## 2025-07-02 RX ORDER — DIAZEPAM 5 MG/1
5 TABLET ORAL
Qty: 2 | Refills: 0 | Status: ACTIVE | COMMUNITY
Start: 2025-07-02 | End: 1900-01-01

## 2025-07-07 ENCOUNTER — OUTPATIENT (OUTPATIENT)
Dept: OUTPATIENT SERVICES | Facility: HOSPITAL | Age: 70
LOS: 1 days | End: 2025-07-07
Payer: COMMERCIAL

## 2025-07-07 ENCOUNTER — APPOINTMENT (OUTPATIENT)
Dept: MRI IMAGING | Facility: CLINIC | Age: 70
End: 2025-07-07

## 2025-07-07 DIAGNOSIS — D32.0 BENIGN NEOPLASM OF CEREBRAL MENINGES: ICD-10-CM

## 2025-07-07 DIAGNOSIS — Z98.890 OTHER SPECIFIED POSTPROCEDURAL STATES: Chronic | ICD-10-CM

## 2025-07-07 PROCEDURE — A9585: CPT

## 2025-07-07 PROCEDURE — 70553 MRI BRAIN STEM W/O & W/DYE: CPT | Mod: 26

## 2025-07-07 PROCEDURE — 70553 MRI BRAIN STEM W/O & W/DYE: CPT

## 2025-08-26 ENCOUNTER — APPOINTMENT (OUTPATIENT)
Dept: NEUROSURGERY | Facility: CLINIC | Age: 70
End: 2025-08-26
Payer: COMMERCIAL

## 2025-08-26 PROCEDURE — 99211 OFF/OP EST MAY X REQ PHY/QHP: CPT | Mod: 93
